# Patient Record
Sex: FEMALE | Race: WHITE | NOT HISPANIC OR LATINO | Employment: UNEMPLOYED | URBAN - METROPOLITAN AREA
[De-identification: names, ages, dates, MRNs, and addresses within clinical notes are randomized per-mention and may not be internally consistent; named-entity substitution may affect disease eponyms.]

---

## 2017-04-17 ENCOUNTER — OFFICE VISIT (OUTPATIENT)
Dept: OBGYN | Facility: CLINIC | Age: 37
End: 2017-04-17
Payer: COMMERCIAL

## 2017-04-17 VITALS
BODY MASS INDEX: 28.3 KG/M2 | HEART RATE: 84 BPM | HEIGHT: 62 IN | SYSTOLIC BLOOD PRESSURE: 120 MMHG | WEIGHT: 153.8 LBS | DIASTOLIC BLOOD PRESSURE: 76 MMHG

## 2017-04-17 DIAGNOSIS — Z30.011 ENCOUNTER FOR INITIAL PRESCRIPTION OF CONTRACEPTIVE PILLS: ICD-10-CM

## 2017-04-17 DIAGNOSIS — Z01.419 ENCOUNTER FOR GYNECOLOGICAL EXAMINATION WITHOUT ABNORMAL FINDING: Primary | ICD-10-CM

## 2017-04-17 PROCEDURE — G0145 SCR C/V CYTO,THINLAYER,RESCR: HCPCS | Performed by: OBSTETRICS & GYNECOLOGY

## 2017-04-17 PROCEDURE — 99385 PREV VISIT NEW AGE 18-39: CPT | Performed by: OBSTETRICS & GYNECOLOGY

## 2017-04-17 PROCEDURE — 87624 HPV HI-RISK TYP POOLED RSLT: CPT | Performed by: OBSTETRICS & GYNECOLOGY

## 2017-04-17 RX ORDER — DROSPIRENONE AND ETHINYL ESTRADIOL 0.03MG-3MG
1 KIT ORAL DAILY
Qty: 84 TABLET | Refills: 3 | Status: SHIPPED | OUTPATIENT
Start: 2017-04-17 | End: 2018-02-01

## 2017-04-17 RX ORDER — NORETHINDRONE 0.35 MG/1
TABLET ORAL
COMMUNITY
Start: 2016-05-31 | End: 2017-04-17

## 2017-04-17 NOTE — MR AVS SNAPSHOT
"              After Visit Summary   4/17/2017    Melissa Severson    MRN: [id number]           Patient Information     Date Of Birth          1980        Visit Information        Provider Department      4/17/2017 6:15 PM Syed Shook MD [facility]        Today's Diagnoses     Encounter for gynecological examination without abnormal finding    -  1    Encounter for initial prescription of contraceptive pills           Follow-ups after your visit        Future tests that were ordered for you today     Open Future Orders        Priority Expected Expires Ordered    *MA Screening Digital Bilateral Routine  4/17/2018 4/17/2017            Who to contact     If you have questions or need follow up information about today's clinic visit or your schedule please contact [facility] directly at [phone].  Normal or non-critical lab and imaging results will be communicated to you by MyChart, letter or phone within 4 business days after the clinic has received the results. If you do not hear from us within 7 days, please contact the clinic through Celirohart or phone. If you have a critical or abnormal lab result, we will notify you by phone as soon as possible.  Submit refill requests through Handango or call your pharmacy and they will forward the refill request to us. Please allow 3 business days for your refill to be completed.          Additional Information About Your Visit        MyChart Information     Handango lets you send messages to your doctor, view your test results, renew your prescriptions, schedule appointments and more. To sign up, go to [email or web address] . Click on \"Log in\" on the left side of the screen, which will take you to the Welcome page. Then click on \"Sign up Now\" on the right side of the page.     You will be asked to enter the access code listed below, as well as some personal information. Please follow the directions to create your username and " "password.     Your access code is: [email or web address]  Expires: 2017  7:54 PM     Your access code will  in 90 days. If you need help or a new code, please call your [place] clinic or [phone].        Care EveryWhere ID     This is your Care EveryWhere ID. This could be used by other organizations to access your [place] medical records  [id number]        Your Vitals Were     Pulse Height Last Period Breastfeeding? BMI (Body Mass Index)       84 5' 2.25\" (1.581 m) 2017 (Approximate) No 27.9 kg/m2        Blood Pressure from Last 3 Encounters:   17 120/76    Weight from Last 3 Encounters:   17 153 lb 12.8 oz (69.8 kg)              We Performed the Following     HPV High Risk Types DNA Cervical     Pap imaged thin layer screen with HPV - recommended age 30 - 65 years (select HPV order below)          Today's Medication Changes          These changes are accurate as of: 17  7:54 PM.  If you have any questions, ask your nurse or doctor.               Start taking these medicines.        Dose/Directions    drospirenone-ethinyl estradiol 3-0.03 MG per tablet   Commonly known as:  JOLIE   Used for:  Encounter for initial prescription of contraceptive pills   Started by:  Syed Shook MD        Dose:  1 tablet   Take 1 tablet by mouth daily   Quantity:  84 tablet   Refills:  3         Stop taking these medicines if you haven't already. Please contact your care team if you have questions.     KIRA 0.35 MG per tablet   Generic drug:  norethindrone   Stopped by:  Syed Shook MD                Where to get your medicines      These medications were sent to [facility]/pharmacy #[id number] - [place], [place] - [address]    [address], [facility] [address]     Phone:  [phone]     drospirenone-ethinyl estradiol 3-0.03 MG per tablet                Primary Care Provider    None Specified       No primary provider on file.        Thank you!     Thank you for choosing [place] [facility]  for your care. Our goal is always to provide you with excellent care. Hearing back from our patients is one way we can continue to improve our services. Please take a few minutes to complete the written survey that you may receive in the mail after your visit with us. Thank you!             Your Updated Medication List - Protect others around you: Learn how to safely use, store and throw away your medicines at www.disposemymeds.org.          This list is accurate as of: 4/17/17  7:54 PM.  Always use your most recent med list.                   Brand Name Dispense Instructions for use    drospirenone-ethinyl estradiol 3-0.03 MG per tablet    JOLIE    84 tablet    Take 1 tablet by mouth daily

## 2017-04-17 NOTE — NURSING NOTE
"Chief Complaint   Patient presents with     Gyn Exam     needs refill of OCP     Establish Care       Initial /76  Pulse 84  Ht 5' 2.25\" (1.581 m)  Wt 153 lb 12.8 oz (69.8 kg)  LMP 2017 (Approximate)  Breastfeeding? No  BMI 27.9 kg/m2 Estimated body mass index is 27.9 kg/(m^2) as calculated from the following:    Height as of this encounter: 5' 2.25\" (1.581 m).    Weight as of this encounter: 153 lb 12.8 oz (69.8 kg).  BP completed using cuff size: regular        The following HM Due: NONE      The following patient reported/Care Every where data was sent to:  P ABSTRACT QUALITY INITIATIVES [[id number]]  NA     n/a             "

## 2017-04-17 NOTE — LETTER
April 26, 2017    Melissa Severson  [address]  [facility] [address]    Dear Mary Ellen,  We are happy to inform you that your PAP smear result from 04/17/2017 is normal.  We are now able to do a follow up test on PAP smears. The DNA test is for HPV (Human Papilloma Virus). Cervical cancer is closely linked with certain types of HPV. Your result showed no evidence of HPV.  Therefore we recommend you return in 3 years for your next pap smear.  You will still need to return to the clinic every year for an annual exam and other preventive tests.  Please contact the clinic at [phone] with any questions.  Sincerely,    Syed Shook MD

## 2017-04-18 NOTE — PROGRESS NOTES
"  SUBJECTIVE:  Melissa Severson is an 36 year old  P2 woman who presents for annual exam. Patient's last menstrual period was 2017 (approximate).       Past Medical History:   Diagnosis Date     H/O calculus of kidney during pregnancy      Past Surgical History:   Procedure Laterality Date     EXC SKIN BENIG 0.5CM OR LESS TRUNK,ARM,LEG Right     lipoma benign     Current Outpatient Prescriptions   Medication     KIRA 0.35 MG per tablet     drospirenone-ethinyl estradiol (JOLIE) 3-0.03 MG per tablet     No current facility-administered medications for this visit.      No Known Allergies  Social History   Substance Use Topics     Smoking status: Never Smoker     Smokeless tobacco: Never Used     Alcohol use Yes      Comment: socially       Review of Systems  CONSTITUTIONAL:NEGATIVE  EYES: NEGATIVE  ENT/MOUTH: NEGATIVE  RESP: NEGATIVE  CV: NEGATIVE  GI: NEGATIVE  : notes history of difficulty voiding/but now improved  MUSCULOSKELATAL: NEGATIVE  INTEGUMENTARY/SKIN: NEGATIVE  BREAST: NEGATIVE  NEURO: NEGATIVE  ENDOCRINE: NEGATIVE  HEME/ALLERGY/IMMUNE: NEGATIVE  PSYCHIATRIC: NEGATIVE    OBJECTIVE:  /76  Pulse 84  Ht 5' 2.25\" (1.581 m)  Wt 153 lb 12.8 oz (69.8 kg)  LMP 2017 (Approximate)  Breastfeeding? No  BMI 27.9 kg/m2   EXAM:  GENERAL APPEARANCE: healthy, alert and no distress  BREAST: normal without masses, tenderness or nipple discharge and no palpable axillary masses or adenopathy  ABDOMEN: soft, nontender, without hepatosplenomegaly or masses    Pelvic Exam:  Urethra; negative  Vulva: No external lesions, normal hair distribution, no adenopathy  Vagina: Moist, pink, no abnormal discharge, well rugated, no lesions  Cervix: Pap smear is taken, parous, smooth, pink, no visible lesions  Uterus: Normal size, anteverted, non-tender, mobile  Ovaries: No mass, non-tender, mobile      ASSESSMENT:  Satisfactory annual gyn exam  History of difficulty emptying bladder       -evaluation " negative; now voiding improved       -records requested (from [facility])    PLAN:  (Z01.419) Encounter for gynecological examination without abnormal finding  (primary encounter diagnosis)  Plan: HPV High Risk Types DNA Cervical, Pap imaged         thin layer screen with HPV - recommended age 30        - 65 years (select HPV order below)            (Z30.011) Encounter for initial prescription of contraceptive pills  Plan: drospirenone-ethinyl estradiol (JOLIE) 3-0.03         MG per tablet            PE: reviewed health maintenance including diet, regular exercise and periodic exams.  Health Maintenance   Topic Date Due     TETANUS IMMUNIZATION (SYSTEM ASSIGNED)  10/03/1999     PAP SCREENING Q3 YR (SYSTEM ASSIGNED)  10/03/2002     INFLUENZA VACCINE (SYSTEM ASSIGNED)  09/01/2017

## 2017-04-21 LAB
COPATH REPORT: NORMAL
PAP: NORMAL

## 2017-04-24 LAB
FINAL DIAGNOSIS: NORMAL
HPV HR 12 DNA CVX QL NAA+PROBE: NEGATIVE
HPV16 DNA SPEC QL NAA+PROBE: NEGATIVE
HPV18 DNA SPEC QL NAA+PROBE: NEGATIVE
SPECIMEN DESCRIPTION: NORMAL

## 2017-04-26 NOTE — PROGRESS NOTES
Pap done on 4/17/17. Please send nl pap and Neg HPV letter, ([id number]) for pap in 3 years and annual physical in 1 year.   Chart reviewed,  cesar.     NALLELY Ibarra RN

## 2017-05-15 ENCOUNTER — HOSPITAL ENCOUNTER (OUTPATIENT)
Dept: MAMMOGRAPHY | Facility: CLINIC | Age: 37
Discharge: HOME | End: 2017-05-15
Attending: OBSTETRICS & GYNECOLOGY | Admitting: OBSTETRICS & GYNECOLOGY
Payer: COMMERCIAL

## 2017-05-15 DIAGNOSIS — Z01.419 ENCOUNTER FOR GYNECOLOGICAL EXAMINATION WITHOUT ABNORMAL FINDING: ICD-10-CM

## 2017-05-15 PROCEDURE — G0202 SCR MAMMO BI INCL CAD: HCPCS

## 2018-02-01 DIAGNOSIS — Z30.011 ENCOUNTER FOR INITIAL PRESCRIPTION OF CONTRACEPTIVE PILLS: ICD-10-CM

## 2018-02-01 RX ORDER — DROSPIRENONE AND ETHINYL ESTRADIOL 0.03MG-3MG
1 KIT ORAL DAILY
Qty: 28 TABLET | Refills: 0 | Status: SHIPPED | OUTPATIENT
Start: 2018-02-01 | End: 2018-03-20

## 2018-03-20 ENCOUNTER — OFFICE VISIT (OUTPATIENT)
Dept: OBGYN | Facility: CLINIC | Age: 38
End: 2018-03-20
Payer: COMMERCIAL

## 2018-03-20 VITALS
DIASTOLIC BLOOD PRESSURE: 62 MMHG | HEART RATE: 82 BPM | BODY MASS INDEX: 23.59 KG/M2 | SYSTOLIC BLOOD PRESSURE: 128 MMHG | WEIGHT: 130 LBS

## 2018-03-20 DIAGNOSIS — Z30.011 ENCOUNTER FOR INITIAL PRESCRIPTION OF CONTRACEPTIVE PILLS: ICD-10-CM

## 2018-03-20 DIAGNOSIS — Z00.00 HEALTH CARE MAINTENANCE: Primary | ICD-10-CM

## 2018-03-20 DIAGNOSIS — R19.7 DIARRHEA, UNSPECIFIED TYPE: ICD-10-CM

## 2018-03-20 LAB
CHOLEST SERPL-MCNC: 206 MG/DL
HDLC SERPL-MCNC: 94 MG/DL
LDLC SERPL CALC-MCNC: 93 MG/DL
NONHDLC SERPL-MCNC: 112 MG/DL
TRIGL SERPL-MCNC: 95 MG/DL

## 2018-03-20 PROCEDURE — 80061 LIPID PANEL: CPT | Performed by: ADVANCED PRACTICE MIDWIFE

## 2018-03-20 PROCEDURE — 99395 PREV VISIT EST AGE 18-39: CPT | Performed by: ADVANCED PRACTICE MIDWIFE

## 2018-03-20 PROCEDURE — 36415 COLL VENOUS BLD VENIPUNCTURE: CPT | Performed by: ADVANCED PRACTICE MIDWIFE

## 2018-03-20 RX ORDER — DROSPIRENONE AND ETHINYL ESTRADIOL 0.03MG-3MG
1 KIT ORAL DAILY
Qty: 84 TABLET | Refills: 3 | Status: SHIPPED | OUTPATIENT
Start: 2018-03-20 | End: 2020-01-20

## 2018-03-20 NOTE — MR AVS SNAPSHOT
After Visit Summary   3/20/2018    Melissa Severson    MRN: [id number]           Patient Information     Date Of Birth          1980        Visit Information        Provider Department      3/20/2018 8:45 AM Araseli Diaz APRN CNM [facility]        Today's Diagnoses     Health care maintenance    -  1    Encounter for initial prescription of contraceptive pills        Diarrhea, unspecified type           Follow-ups after your visit        Additional Services     GASTROENTEROLOGY ADULT REF CONSULT ONLY       Preferred Location: [facility] [phone]      Please be aware that coverage of these services is subject to the terms and limitations of your health insurance plan.  Call member services at your health plan with any benefit or coverage questions.  Any procedures must be performed at a [place] facility OR coordinated by your clinic's referral office.    Please bring the following with you to your appointment:    (1) Any X-Rays, CTs or MRIs which have been performed.  Contact the facility where they were done to arrange for  prior to your scheduled appointment.    (2) List of current medications   (3) This referral request   (4) Any documents/labs given to you for this referral                  Follow-up notes from your care team     Return in about 1 year (around 3/20/2019).      Who to contact     If you have questions or need follow up information about today's clinic visit or your schedule please contact [facility] directly at [phone].  Normal or non-critical lab and imaging results will be communicated to you by MyChart, letter or phone within 4 business days after the clinic has received the results. If you do not hear from us within 7 days, please contact the clinic through MyChart or phone. If you have a critical or abnormal lab result, we will notify you by phone as soon as possible.  Submit refill requests through [employer]hart or call your  "pharmacy and they will forward the refill request to us. Please allow 3 business days for your refill to be completed.          Additional Information About Your Visit        MyChart Information     [employer] lets you send messages to your doctor, view your test results, renew your prescriptions, schedule appointments and more. To sign up, go to [email or web address] . Click on \"Log in\" on the left side of the screen, which will take you to the Welcome page. Then click on \"Sign up Now\" on the right side of the page.     You will be asked to enter the access code listed below, as well as some personal information. Please follow the directions to create your username and password.     Your access code is: [email or web address]  Expires: 2018  9:17 AM     Your access code will  in 90 days. If you need help or a new code, please call your [place] clinic or [phone].        Care EveryWhere ID     This is your Care EveryWhere ID. This could be used by other organizations to access your [place] medical records  [id number]        Your Vitals Were     Pulse Last Period Breastfeeding? BMI (Body Mass Index)          82 2018 (Exact Date) No 23.59 kg/m2         Blood Pressure from Last 3 Encounters:   18 128/62   17 120/76    Weight from Last 3 Encounters:   18 130 lb (59 kg)   17 153 lb 12.8 oz (69.8 kg)              We Performed the Following     GASTROENTEROLOGY ADULT REF CONSULT ONLY     Lipid panel reflex to direct LDL Fasting          Where to get your medicines      These medications were sent to [id number] - [place], [place] - [address]    [address], [facility] [address]     Phone:  [phone]     drospirenone-ethinyl estradiol 3-0.03 MG per tablet          Primary Care Provider Office Phone # Fax #    Syed Shook [phone] [address] [place] [address]        Equal Access to Services     GEORGE LEE AH: Hadii " ender Law, wamariano juárezadaha, qajanuaryta kaximena tristonsri, waxseamus christy rosalesryleesaundra chen butch. So [facility] [phone].    ATENCIÓN: Si habla español, tiene a morris disposición servicios gratuitos de asistencia lingüística. Llame al [phone].    We comply with applicable federal civil rights laws and [place] laws. We do not discriminate on the basis of race, color, national origin, age, disability, sex, sexual orientation, or gender identity.            Thank you!     Thank you for choosing [facility]  for your care. Our goal is always to provide you with excellent care. Hearing back from our patients is one way we can continue to improve our services. Please take a few minutes to complete the written survey that you may receive in the mail after your visit with us. Thank you!             Your Updated Medication List - Protect others around you: Learn how to safely use, store and throw away your medicines at www.disposemymeds.org.          This list is accurate as of 3/20/18  9:17 AM.  Always use your most recent med list.                   Brand Name Dispense Instructions for use Diagnosis    drospirenone-ethinyl estradiol 3-0.03 MG per tablet    JOLIE    84 tablet    Take 1 tablet by mouth daily    Encounter for initial prescription of contraceptive pills          clear

## 2018-03-20 NOTE — PROGRESS NOTES
"Mary Ellen is a 37 year old  female who presents for annual exam.     Besides routine health maintenance,  she would like to discuss recurrent diarrhea that has been present for about 2 years. States that the diarrea is not daily but seems to occur \"most often than normal.\"  States she has a first cousin that was diagnosed with colon cancer at age 34.   Menses are regular q 28-30 days and normal lasting 4 days.   Menses flow: normal.    Patient's last menstrual period was 2018 (exact date)..   Using oral contraceptives for contraception.    She is not currently considering pregnancy.    REPRODUCTIVE/GYNECOLOGIC HISTORY:  Mary Ellen is sexually active with male partner(s) and is currently in monogamous relationship.   STI testing offered?  Declined  History of abnormal Pap smear:  No  SOCIAL HISTORY  Abuse: does not report having previously been physical or sexually abused.    Do you feel safe in your environment? YES    She  reports that she has never smoked. She has never used smokeless tobacco.      HEALTH MAINTENANCE:  Cholesterol: (No results found for: CHOL History of abnormal lipids: No  Mammo: Baseline . History of abnormal Mammo: No.  Regular Self Breast Exams: No  TSH: (No results found for: TSH )  Pap; (  Lab Results   Component Value Date    PAP NIL 2017    )  Immunizations up to date: yes  (Gardasil, Tdap, Flu)  Health maintenance updated:  yes    HEALTHY HABITS  Eating habits: eats regular meals and has adequate calcium intake  Calcium/Vitamin D intake: source:  dairy Adequate?   Exercise: How often do you exercise? 3-5 times/week;Cardio and Strength Training  Have you had an eye exam in the last two years? YES  Do you routinely see the dentist once or twice yearly? YES      HISTORY:  Obstetric History       T2      L2     SAB0   TAB0   Ectopic0   Multiple0   Live Births2       # Outcome Date GA Lbr Dk/2nd Weight Sex Delivery Anes PTL Lv   2 Term 14   6 lb 8 oz (2.948 " kg) M CS-LTranv  N SHELLI      Complications: Fetal Intolerance   1 Term 12 40w2d  6 lb 14 oz (3.118 kg) F   N SHELLI        Past Medical History:   Diagnosis Date     H/O calculus of kidney during pregnancy      Past Surgical History:   Procedure Laterality Date     EXC SKIN BENIG 0.5CM OR LESS TRUNK,ARM,LEG Right     lipoma benign     Family History   Problem Relation Age of Onset     Hypertension Mother      Lung Cancer Father 55     passed 56yrs     Melanoma Maternal Grandfather      HEART DISEASE Paternal Grandmother      HEART DISEASE Paternal Grandfather      Social History     Social History     Marital status:      Spouse name: N/A     Number of children: N/A     Years of education: N/A     Social History Main Topics     Smoking status: Never Smoker     Smokeless tobacco: Never Used     Alcohol use Yes      Comment: socially     Drug use: No     Sexual activity: Yes     Partners: Male     Birth control/ protection: Pill     Other Topics Concern     None     Social History Narrative       Current Outpatient Prescriptions:      drospirenone-ethinyl estradiol (JOLIE) 3-0.03 MG per tablet, Take 1 tablet by mouth daily, Disp: 28 tablet, Rfl: 0     Allergies   Allergen Reactions     Amoxicillin Rash       Past medical, surgical, social and family history were reviewed and updated in EPIC.    ROS:   12 point review of systems negative other than symptoms noted below.  Gastrointestinal: Diarrhea    PHYSICAL EXAM:  /62 (BP Location: Right arm, Patient Position: Sitting, Cuff Size: Adult Regular)  Pulse 82  Wt 130 lb (59 kg)  LMP 2018 (Exact Date)  Breastfeeding? No  BMI 23.59 kg/m2   BMI: Body mass index is 23.59 kg/(m^2).  Constitutional: healthy, alert and no distress  Neck: symmetrical, thyroid normal size, no masses present, no lymphadenopathy present.   Cardiovascular: RRR, no murmurs present  Respiratory: breathing unlabored, lungs CTA bilaterally  Breast:normal without  masses, tenderness or nipple discharge and no palpable axillary masses or adenopathy  Gastrointestinal: abdomen soft, non-tender, bowel sounds present  PELVIC EXAM:  Vulva: No lesions, no adenopathy, BUS WNL  Vagina: Moist, pink, discharge normal  well rugated, no lesions  Cervix:smooth, pink, no visible lesions  Uterus: Normal size, non-tender, mobile  Ovaries: No masses palpated  Rectal exam: deferred    ASSESSMENT/PLAN:    ICD-10-CM    1. Health care maintenance Z00.00 Lipid panel reflex to direct LDL Fasting   2. Encounter for initial prescription of contraceptive pills Z30.011 drospirenone-ethinyl estradiol (JOLIE) 3-0.03 MG per tablet   3. Diarrhea, unspecified type R19.7 GASTROENTEROLOGY ADULT REF CONSULT ONLY           COUNSELING:   Reviewed preventive health counseling, as reflected in patient instructions       Regular exercise       Healthy diet/nutrition       Contraception     ACHES reviewed and when to seek medical attention  Script for oral contraceptive pill sent to pharmacy on file  Referral to [facility] for ongoing diarrhea  Lipid panel today  Return to Clinic In 1 year  Call with questions/concerns    30 minutes was spent face to face with the patient today discussing her history, diagnosis, and follow-up plan as noted above. Over 50% of the visit was spent in counseling and coordination of care of oral contraceptive pill, GI follow up.    KEVIN Louise, CNCODEY, WHTAPAN

## 2018-03-20 NOTE — NURSING NOTE
"Chief Complaint   Patient presents with     Gyn Exam     Annual with birth control refill       Initial /62 (BP Location: Right arm, Patient Position: Sitting, Cuff Size: Adult Regular)  Pulse 82  Wt 130 lb (59 kg)  LMP 2018 (Exact Date)  Breastfeeding? No  BMI 23.59 kg/m2 Estimated body mass index is 23.59 kg/(m^2) as calculated from the following:    Height as of 17: 5' 2.25\" (1.581 m).    Weight as of this encounter: 130 lb (59 kg).  BP completed using cuff size: regular        The following HM Due: NONE      The following patient reported/Care Every where data was sent to:  P ABSTRACT QUALITY INITIATIVES [[id number]]      patient has appointment for today.  William Ohara CMA                 "

## 2019-02-27 ENCOUNTER — OFFICE VISIT (OUTPATIENT)
Dept: OBGYN | Facility: CLINIC | Age: 39
End: 2019-02-27
Payer: COMMERCIAL

## 2019-02-27 VITALS
WEIGHT: 132 LBS | SYSTOLIC BLOOD PRESSURE: 128 MMHG | BODY MASS INDEX: 23.39 KG/M2 | DIASTOLIC BLOOD PRESSURE: 78 MMHG | HEIGHT: 63 IN

## 2019-02-27 DIAGNOSIS — N81.11 CYSTOCELE, MIDLINE: ICD-10-CM

## 2019-02-27 DIAGNOSIS — Z01.419 WELL WOMAN EXAM: Primary | ICD-10-CM

## 2019-02-27 DIAGNOSIS — Z30.41 ENCOUNTER FOR SURVEILLANCE OF CONTRACEPTIVE PILLS: ICD-10-CM

## 2019-02-27 DIAGNOSIS — R19.7 DIARRHEA, UNSPECIFIED TYPE: ICD-10-CM

## 2019-02-27 LAB — HBA1C MFR BLD: 4.9 % (ref 0–5.6)

## 2019-02-27 PROCEDURE — 99395 PREV VISIT EST AGE 18-39: CPT | Performed by: ADVANCED PRACTICE MIDWIFE

## 2019-02-27 PROCEDURE — 80061 LIPID PANEL: CPT | Performed by: ADVANCED PRACTICE MIDWIFE

## 2019-02-27 PROCEDURE — 36415 COLL VENOUS BLD VENIPUNCTURE: CPT | Performed by: ADVANCED PRACTICE MIDWIFE

## 2019-02-27 PROCEDURE — 83036 HEMOGLOBIN GLYCOSYLATED A1C: CPT | Performed by: ADVANCED PRACTICE MIDWIFE

## 2019-02-27 RX ORDER — DROSPIRENONE AND ETHINYL ESTRADIOL 0.03MG-3MG
1 KIT ORAL DAILY
Qty: 90 TABLET | Refills: 3 | Status: SHIPPED | OUTPATIENT
Start: 2019-02-27 | End: 2020-01-17

## 2019-02-27 ASSESSMENT — MIFFLIN-ST. JEOR: SCORE: 1252.88

## 2019-02-27 NOTE — NURSING NOTE
"Chief Complaint   Patient presents with     Gyn Exam     Referral to GI- diarrhea      Gastrointestinal Problem       Initial /78 (BP Location: Right arm, Patient Position: Sitting, Cuff Size: Adult Regular)   Ht 1.6 m (5' 3\")   Wt 59.9 kg (132 lb)   BMI 23.38 kg/m   Estimated body mass index is 23.38 kg/m  as calculated from the following:    Height as of this encounter: 1.6 m (5' 3\").    Weight as of this encounter: 59.9 kg (132 lb).  BP completed using cuff size: regular    Questioned patient about current smoking habits.  Pt. has never smoked.          The following HM Due: NONE    William Ohara CMA                "

## 2019-02-27 NOTE — PROGRESS NOTES
Mary Ellen is a 38 year old  female who presents for annual exam.     Besides routine health maintenance,  she would like to discuss frequent diarrhea.  HPI:  Patient states that she has been having frequent diarrhea for 3+ years. She sometimes experiences this daily for a week and then will go a few days without issues. Denies blood in the stool. Denies hemorrhoids. States she thinks it may be related to her diet or coffee but she is concerned because her ~30 year old cousin was recently diagnosed with colon cancer. Denies nausea and vomiting. Denies constipation. Does not think it is related to her menstrual cycle.   Menses are regular q 28-30 days and normal lasting 5 days.   Menses flow: normal.    LMP 2019  Using oral contraceptives for contraception.    She is not currently considering pregnancy.    REPRODUCTIVE/GYNECOLOGIC HISTORY:  Mary Ellen is sexually active with male partner(s) and is currently in monogamous relationship.   STI testing offered?  Declined  History of abnormal Pap smear:  No  SOCIAL HISTORY  Abuse: does not report having previously been physical or sexually abused.    Do you feel safe in your environment? YES    She  reports that  has never smoked. she has never used smokeless tobacco.        HEALTH MAINTENANCE:  Cholesterol: (  Cholesterol   Date Value Ref Range Status   2018 206 (H) <200 mg/dL Final     Comment:     Desirable:       <200 mg/dl    History of abnormal lipids: Yes  Mammo: 2017 . History of abnormal Mammo: No.  TSH: (No results found for: TSH )  Pap; (  Lab Results   Component Value Date    PAP NIL 2017    )  Immunizations up to date: yes  (Gardasil, Tdap, Flu)  Health maintenance updated:  yes    HEALTHY HABITS  Eating habits: eats regular meals  Calcium/Vitamin D intake: source:  dairy Adequate?   Exercise: How often do you exercise? 3-5 times/week; Cardio  Have you had an eye exam in the last two years? YES  Do you routinely see the dentist once or twice  yearly? YES      HISTORY:  Obstetric History       T2      L2     SAB0   TAB0   Ectopic0   Multiple0   Live Births2       # Outcome Date GA Lbr Dk/2nd Weight Sex Delivery Anes PTL Lv   2 Term 14   2.948 kg (6 lb 8 oz) M CS-LTranv  N SHELLI      Complications: Fetal Intolerance   1 Term 12 40w2d  3.118 kg (6 lb 14 oz) F   N SHELLI        Past Medical History:   Diagnosis Date     H/O calculus of kidney during pregnancy      Past Surgical History:   Procedure Laterality Date     EXC SKIN BENIG 0.5CM OR LESS TRUNK,ARM,LEG Right     lipoma benign     Family History   Problem Relation Age of Onset     Hypertension Mother      Lung Cancer Father 55        passed 56yrs     Other Cancer Maternal Grandfather         Basal Cell Carcinoma     Heart Disease Paternal Grandmother      Heart Disease Paternal Grandfather      Social History     Socioeconomic History     Marital status:      Spouse name: None     Number of children: None     Years of education: None     Highest education level: None   Occupational History     None   Social Needs     Financial resource strain: None     Food insecurity:     Worry: None     Inability: None     Transportation needs:     Medical: None     Non-medical: None   Tobacco Use     Smoking status: Never Smoker     Smokeless tobacco: Never Used   Substance and Sexual Activity     Alcohol use: Yes     Comment: socially     Drug use: No     Sexual activity: Yes     Partners: Male     Birth control/protection: Pill   Lifestyle     Physical activity:     Days per week: None     Minutes per session: None     Stress: None   Relationships     Social connections:     Talks on phone: None     Gets together: None     Attends Restorationism service: None     Active member of club or organization: None     Attends meetings of clubs or organizations: None     Relationship status: None     Intimate partner violence:     Fear of current or ex partner: None     Emotionally  "abused: None     Physically abused: None     Forced sexual activity: None   Other Topics Concern     Parent/sibling w/ CABG, MI or angioplasty before 65F 55M? Not Asked   Social History Narrative     None       Current Outpatient Medications:      drospirenone-ethinyl estradiol (JOLIE) 3-0.03 MG per tablet, Take 1 tablet by mouth daily, Disp: 84 tablet, Rfl: 3     drospirenone-ethinyl estradiol (JOLIE) 3-0.03 MG tablet, Take 1 tablet by mouth daily, Disp: 90 tablet, Rfl: 3     Allergies   Allergen Reactions     Amoxicillin Rash       Past medical, surgical, social and family history were reviewed and updated in EPIC.    ROS:   12 point review of systems negative other than symptoms noted below.  Gastrointestinal: Diarrhea  Genitourinary: Incontinence, occasionally when she coughs or laughs    PHYSICAL EXAM:  /78 (BP Location: Right arm, Patient Position: Sitting, Cuff Size: Adult Regular)   Ht 1.6 m (5' 3\")   Wt 59.9 kg (132 lb)   BMI 23.38 kg/m     BMI: Body mass index is 23.38 kg/m .  Constitutional: healthy, alert and no distress  Neck: symmetrical, thyroid normal size, no masses present, no lymphadenopathy present.   Cardiovascular: RRR, no murmurs present  Respiratory: breathing unlabored, lungs CTA bilaterally  Breast:normal without masses, tenderness or nipple discharge and no palpable axillary masses or adenopathy  Gastrointestinal: abdomen soft, non-tender, no rebound tenderness, bowel sounds present  PELVIC EXAM:  Vulva: No lesions, no adenopathy, BUS WNL  Vagina: Moist. SVE deferred. Midline cystocele grade 1 noted.   Cervix:smooth, no CMT  Uterus: Normal size, non-tender, mobile  Ovaries: No masses palpated  Rectal exam: No hemorrhoids present    ASSESSMENT/PLAN:    ICD-10-CM    1. Well woman exam Z01.419 Hemoglobin A1c     Lipid panel reflex to direct LDL Fasting   2. Diarrhea, unspecified type R19.7 GASTROENTEROLOGY ADULT REF CONSULT ONLY   3. Encounter for surveillance of contraceptive " pills Z30.41 drospirenone-ethinyl estradiol (JOLIE) 3-0.03 MG tablet   4. Cystocele, midline N81.11 TIFF PT, HAND, AND CHIROPRACTIC REFERRAL     Results for orders placed or performed in visit on 02/27/19   Hemoglobin A1c   Result Value Ref Range    Hemoglobin A1C 4.9 0 - 5.6 %     Follow up with GI ordered. Suspect likely irritable bowel syndrome.   Follow up with pelvic floor physical therapy for cystocele, occasional stress incontinence, and likely IBS  Refilled OCPs  Return in 1 year or PRN    COUNSELING:   Reviewed preventive health counseling, as reflected in patient instructions  Special attention given to:        Contraception   reports that  has never smoked. she has never used smokeless tobacco.      Tammy Ellis CNM, WHNP-BC

## 2019-02-28 LAB
CHOLEST SERPL-MCNC: 221 MG/DL
HDLC SERPL-MCNC: 105 MG/DL
LDLC SERPL CALC-MCNC: 99 MG/DL
NONHDLC SERPL-MCNC: 116 MG/DL
TRIGL SERPL-MCNC: 84 MG/DL

## 2019-03-19 ENCOUNTER — THERAPY VISIT (OUTPATIENT)
Dept: PHYSICAL THERAPY | Facility: CLINIC | Age: 39
End: 2019-03-19
Payer: COMMERCIAL

## 2019-03-19 DIAGNOSIS — M62.81 MUSCLE WEAKNESS (GENERALIZED): ICD-10-CM

## 2019-03-19 DIAGNOSIS — R35.0 URINARY FREQUENCY: ICD-10-CM

## 2019-03-19 DIAGNOSIS — N81.11 CYSTOCELE, MIDLINE: ICD-10-CM

## 2019-03-19 PROCEDURE — 97161 PT EVAL LOW COMPLEX 20 MIN: CPT | Mod: GP | Performed by: PHYSICAL THERAPIST

## 2019-03-19 PROCEDURE — 97110 THERAPEUTIC EXERCISES: CPT | Mod: GP | Performed by: PHYSICAL THERAPIST

## 2019-03-19 PROCEDURE — 97535 SELF CARE MNGMENT TRAINING: CPT | Mod: GP | Performed by: PHYSICAL THERAPIST

## 2019-03-19 NOTE — PROGRESS NOTES
[facility] Initial Evaluation  Subjective:                                         Medical allergies: sensitive to amox.  Other surgeries include:  Other ().  Medication history: birth control pills, multi-vit.  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            Red flags:  Changes in bowel/bladder.                        Objective:  System    Physical Exam    General     ROS    Assessment/Plan:

## 2019-03-19 NOTE — PROGRESS NOTES
[facility] Initial Evaluation  Subjective:                                         Medical allergies: sensitive to amox.  Other surgeries include:  Other ().  Medication history: birth control, multi vit.  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            Red flags:  Changes in bowel/bladder.                        Objective:  System    Physical Exam    General     ROS    Assessment/Plan:

## 2019-03-19 NOTE — PROGRESS NOTES
[facility] Initial Evaluation  Subjective:    Melissa Severson is a 38 year old female with a pelvic dysfunction (urinary frequency, incomplete voiding) condition.  Condition occurred with:  After pregnancy (2 children, ages 5 & 7).      Patient reports no incontinence or dyspareunia. History of vaginal pain x 9 months after first child (vaginal deilvery) and difficulty initiating voiding after solis catheter was removed after second delivery (). She also had kidney stones during first pregnancy. She has been having frequent diarrhea and will be seeing a GI specialist in .                                                                        Objective:  System                                 Pelvic Dysfunction Evaluation:      Diagnostic Tests:  Diagnostic tests pelvic: pelvic US was normal.  Pelvic Exam:  Normal                      Flexibility:  normal      Abdominal Wall:        Scar Mobility:  Mild restrictions by umbilicus and  scar  Pelvic Clock Exam:  normal            SI Provocation:  normal        Reflex Testing:  normal    External Assessment:  normal              Internal Assessment:  Internal assessment pelvic: god contraction, delayed release of levator ani mm. Non-tender.  Sensory Exam:  Normal  Contraction/Grade:  Fair squeeze, definite lift (3)          SEMG Biofeedback:  NA                  Additional History:    Number of Pregnancies: 2  Number of Live Births: 2  Caffeine Consumption:  2 cups in am                     General     ROS    Assessment/Plan:    Patient is a 38 year old female with pelvic complaints.    Patient has the following significant findings with corresponding treatment plan.                Diagnosis 1:  Pelvic floor dysfunction  Decreased ROM/flexibility - therapeutic exercise and home program  Decreased strength - therapeutic exercise, therapeutic activities and home program  Impaired muscle performance - neuro re-education and self  cares    Therapy Evaluation Codes:   1) History comprised of:   Personal factors that impact the plan of care:      None.    Comorbidity factors that impact the plan of care are:      None.     Medications impacting care: None.  2) Examination of Body Systems comprised of:   Body structures and functions that impact the plan of care:      Pelvis.   Activity limitations that impact the plan of care are:      Frequency and incomplete voiding.  3) Clinical presentation characteristics are:   Stable/Uncomplicated.  4) Decision-Making    Low complexity using standardized patient assessment instrument and/or measureable assessment of functional outcome.  Cumulative Therapy Evaluation is: Low complexity.    Previous and current functional limitations:  (See Goal Flow Sheet for this information)    Short term and Long term goals: (See Goal Flow Sheet for this information)     Communication ability:  Patient appears to be able to clearly communicate and understand verbal and written communication and follow directions correctly.  Treatment Explanation - The following has been discussed with the patient:   RX ordered/plan of care  Anticipated outcomes  Possible risks and side effects  This patient would benefit from PT intervention to resume normal activities.   Rehab potential is excellent.    Frequency:  2 X a month, once daily  Duration:  for 2 months  Discharge Plan:  Achieve all LTG.  Independent in home treatment program.  Reach maximal therapeutic benefit.    Please refer to the daily flowsheet for treatment today, total treatment time and time spent performing 1:1 timed codes.

## 2019-04-09 NOTE — TELEPHONE ENCOUNTER
FUTURE VISIT INFORMATION      FUTURE VISIT INFORMATION:    Date: 6/7/19    Time: 1:40pm    Location: [facility]  REFERRAL INFORMATION:    Referring provider:  Tammy Ellis CNM    Referring providers clinic:  [facility]    Reason for visit/diagnosis:  Diarrhea (3 years)    NOTES STATUS DETAILS   OFFICE NOTE from referring provider  Internal 2/27/19   OFFICE NOTE from other specialist   Internal 3/20/18   DISCHARGE SUMMARY FROM HOSPITAL N/A    DISCHARGE REPORT FROM ED N/A    OPERATIVE REPORT  N/A    PFC REPORT N/A    MEDICATION LIST Internal    LABS     FIT/STOOL TESTING N/A    PERTINENT LABS N/A    PATHOLOGY REPORTS RELATED TO DIAGNOSIS N/A    DIAGNOSTIC PROCEDURES     COLONOSCOPY N/A    ENDOSCOPY (EGD) N/A    ERCP N/A    EUS N/A    FLEX SIGMOIDOSCOPY N/A    IMAGING & REPORT      CT, MRI, US, XR N/A

## 2019-04-16 ENCOUNTER — THERAPY VISIT (OUTPATIENT)
Dept: PHYSICAL THERAPY | Facility: CLINIC | Age: 39
End: 2019-04-16
Payer: COMMERCIAL

## 2019-04-16 DIAGNOSIS — M62.81 MUSCLE WEAKNESS (GENERALIZED): ICD-10-CM

## 2019-04-16 DIAGNOSIS — R35.0 URINARY FREQUENCY: ICD-10-CM

## 2019-04-16 PROCEDURE — 97535 SELF CARE MNGMENT TRAINING: CPT | Mod: GP | Performed by: PHYSICAL THERAPIST

## 2019-04-16 PROCEDURE — 97110 THERAPEUTIC EXERCISES: CPT | Mod: GP | Performed by: PHYSICAL THERAPIST

## 2019-04-16 PROCEDURE — 97112 NEUROMUSCULAR REEDUCATION: CPT | Mod: GP | Performed by: PHYSICAL THERAPIST

## 2019-04-16 NOTE — PROGRESS NOTES
Subjective:  HPI                    Objective:  System    Physical Exam    General     ROS    Assessment/Plan:    DISCHARGE REPORT    Progress reporting period is from 3/19/2019 to 4/16/2019.     SUBJECTIVE    Subjective: Patient reports significant improvement since starting pelvic PT. She is able to fully void without pushing 95% of the time. She is much more aware of needing to release pelvic floor muscles.    Current Pain level: 0/10   Initial Pain level: 0/10   Changes in function: Yes, see goal flow sheet for change in function   Adverse reactions: None.    OBJECTIVE    Objective: Good awareness of pelvic floor muscles in relation to bladder and voiding. Instructed in timed voiding and urge suppression techniques to help with urinary frequency. Added core strengthening exercises and tolerated well.       ASSESSMENT/PLAN    STG/LTGs have been met or progress has been made towards goals:  Yes (See Goal flow sheet completed today.)  Assessment of Progress: The patient's condition is improving.  The patient's condition has potential to improve.  Self Management Plans:  Patient is independent in a home treatment program.  Patient is independent in self management of symptoms.    Mary Ellen continues to require the following intervention to meet STG and LTG's: PT intervention is no longer required to meet STG/LTG.      Recommendations:    This patient is ready to be discharged from therapy and continue their home treatment program.    Please refer to the daily flowsheet for treatment today, total treatment time and time spent performing 1:1 timed codes.

## 2019-05-31 ENCOUNTER — TELEPHONE (OUTPATIENT)
Dept: GASTROENTEROLOGY | Facility: CLINIC | Age: 39
End: 2019-05-31

## 2019-06-07 ENCOUNTER — TELEPHONE (OUTPATIENT)
Dept: GASTROENTEROLOGY | Facility: CLINIC | Age: 39
End: 2019-06-07

## 2019-06-07 ENCOUNTER — PRE VISIT (OUTPATIENT)
Dept: GASTROENTEROLOGY | Facility: CLINIC | Age: 39
End: 2019-06-07

## 2019-06-07 ENCOUNTER — OFFICE VISIT (OUTPATIENT)
Dept: GASTROENTEROLOGY | Facility: CLINIC | Age: 39
End: 2019-06-07
Attending: ADVANCED PRACTICE MIDWIFE
Payer: COMMERCIAL

## 2019-06-07 VITALS
WEIGHT: 135.8 LBS | HEIGHT: 63 IN | DIASTOLIC BLOOD PRESSURE: 98 MMHG | SYSTOLIC BLOOD PRESSURE: 149 MMHG | HEART RATE: 75 BPM | OXYGEN SATURATION: 96 % | BODY MASS INDEX: 24.06 KG/M2

## 2019-06-07 DIAGNOSIS — R19.4 CHANGE IN BOWEL HABIT: ICD-10-CM

## 2019-06-07 DIAGNOSIS — R19.4 CHANGE IN BOWEL HABIT: Primary | ICD-10-CM

## 2019-06-07 LAB
ALBUMIN SERPL-MCNC: 3.9 G/DL (ref 3.4–5)
ALP SERPL-CCNC: 46 U/L (ref 40–150)
ALT SERPL W P-5'-P-CCNC: 20 U/L (ref 0–50)
ANION GAP SERPL CALCULATED.3IONS-SCNC: 5 MMOL/L (ref 3–14)
AST SERPL W P-5'-P-CCNC: 20 U/L (ref 0–45)
BILIRUB SERPL-MCNC: 0.3 MG/DL (ref 0.2–1.3)
BUN SERPL-MCNC: 13 MG/DL (ref 7–30)
CALCIUM SERPL-MCNC: 9.4 MG/DL (ref 8.5–10.1)
CHLORIDE SERPL-SCNC: 105 MMOL/L (ref 94–109)
CO2 SERPL-SCNC: 26 MMOL/L (ref 20–32)
CREAT SERPL-MCNC: 0.73 MG/DL (ref 0.52–1.04)
ERYTHROCYTE [DISTWIDTH] IN BLOOD BY AUTOMATED COUNT: 11.7 % (ref 10–15)
ERYTHROCYTE [SEDIMENTATION RATE] IN BLOOD BY WESTERGREN METHOD: 8 MM/H (ref 0–20)
GFR SERPL CREATININE-BSD FRML MDRD: >90 ML/MIN/{1.73_M2}
GLUCOSE SERPL-MCNC: 91 MG/DL (ref 70–99)
HCT VFR BLD AUTO: 39.8 % (ref 35–47)
HGB BLD-MCNC: 13.4 G/DL (ref 11.7–15.7)
MCH RBC QN AUTO: 33.1 PG (ref 26.5–33)
MCHC RBC AUTO-ENTMCNC: 33.7 G/DL (ref 31.5–36.5)
MCV RBC AUTO: 98 FL (ref 78–100)
PLATELET # BLD AUTO: 224 10E9/L (ref 150–450)
POTASSIUM SERPL-SCNC: 4 MMOL/L (ref 3.4–5.3)
PROT SERPL-MCNC: 7.8 G/DL (ref 6.8–8.8)
RBC # BLD AUTO: 4.05 10E12/L (ref 3.8–5.2)
SODIUM SERPL-SCNC: 137 MMOL/L (ref 133–144)
WBC # BLD AUTO: 7.2 10E9/L (ref 4–11)

## 2019-06-07 ASSESSMENT — ENCOUNTER SYMPTOMS
BOWEL INCONTINENCE: 0
RECTAL PAIN: 0
DIARRHEA: 1
JAUNDICE: 0
ABDOMINAL PAIN: 0
BLOOD IN STOOL: 0
NAUSEA: 0
CONSTIPATION: 0
BLOATING: 0
HEARTBURN: 0
VOMITING: 0

## 2019-06-07 ASSESSMENT — MIFFLIN-ST. JEOR: SCORE: 1270.11

## 2019-06-07 ASSESSMENT — PAIN SCALES - GENERAL: PAINLEVEL: NO PAIN (0)

## 2019-06-07 NOTE — LETTER
6/7/2019       RE: Melissa Severson  [id number]  [place] [address]     Dear Colleague,    Thank you for referring your patient, Melissa Severson, to the [facility] at [facility]. Please see a copy of my visit note below.    GI CLINIC VISIT    CC/REFERRING MD:  Tammy Ellis  REASON FOR CONSULTATION: change in bowel habits    ASSESSMENT/PLAN:  38-year-old female with no significant past medical history who presents to the GI clinic for consultation regarding change in bowel habits over the last 3 to 5 years.     1.  Change in bowel pattern: It seems that patient has a long-standing history of passing firm her stools, even at a young age.  Frequency of her bowel pattern has not been consistent with constipation.  Interesting that her variation in her bowel pattern improved with physical therapy for pelvic floor dysfunction related to straining for full evacuation of urine.  This certainly would put her at increased risk for pelvic floor dysfunction that would contribute to incomplete evacuation of stool.  Patient is very concerned regarding colon cancer given her cousin diagnosed at an early age.  Patient would like to proceed with a colonoscopy given that these changes over the last few years, which I have agreed to order.  Laboratory studies to include CBC and celiac markers (TTG IgA and IgG) and total IgA to be completed.  Random biopsies to be obtained during colonoscopy.  If her celiac markers are elevated, we will then add an EGD to her colonoscopy.  If the above is unremarkable, consider evaluation for pelvic floor dysfunction.  I also encourage patient to pay close attention to how she feels after the colonoscopy prep.  If there is a significant improvement in her symptoms, initiation of MiraLAX and comities with soluble fiber may allow for improved regularity.  If no significant change, she may benefit from initiation of  "soluble fiber to improve consistency of the stool.  --Colonoscopy to be scheduled  --Lab work to include CBC and celiac markers    2. Colorectal cancer screening: Patient has family history with cousin diagnosed with colon cancer in her early 30s.  Proceeding with colonoscopy in the near future and can then determine surveillance screening plan.    RTC 3 months    Thank you for this consultation.  It was a pleasure to participate in the care of this patient; please contact us with any further questions.     William Moy PA-C  Division of Gastroenterology, Hepatology and Nutrition  [facility]    HPI  38-year-old female with no significant past medical history who presents to the GI clinic for consultation regarding change in bowel habits over the last 3 to 5 years.  Patient notes that she is had problems passing more firm stools her whole life and noticed a change to softer occasionally looser stools over the last few years.  She denies any liquid watery stools.  She denies any blood in the stool.  She has excess flatulence with foul odor, especially by the end of the day.  She had actually been evaluated for pelvic floor dysfunction due to straining for full evacuation of urine.  Physical therapy was recommended at that time.  She noted when she had been diligent on pelvic floor relaxation exercises, this allowed for improvement in her bowel pattern, decreasing her episodes to 1-2 times in that month from previously having looser stools several times per week.  She remembers being told that instead of relaxing her pelvic floor muscles, she was \"holding it in.\"    Over the last 6 months her average bowel pattern is 2 soft bowel movements per day without blood.  She may have occasional abdominal cramping before bowel movement that is relieved with gas and passage of stool.  She denies any fever weight loss or extraintestinal manifestations. She had not tried any dietary modifications or bowel medications " to improve consistency or regularity.    Of note, patient mentions that her cousin had been diagnosed with colon cancer in her early 30s and has now discontinued all therapies at this time. She is tearful with this news and fears colon cancer.    ROS:    No fevers or chills  No weight loss  No blurry vision, double vision or change in vision  No sore throat  No lymphadenopathy  No headache, paraesthesias, or weakness in a limb  No shortness of breath or wheezing  No chest pain or pressure  No arthralgias or myalgias  No rashes or skin changes  No odynophagia or dysphagia  No BRBPR, hematochezia, melena  No dysuria, frequency or urgency  No hot/cold intolerance or polyria  + anxiety (related to child)    PROBLEM LIST  Patient Active Problem List    Diagnosis Date Noted     Diarrhea, unspecified type 2018     Priority: Medium     NO ACTIVE PROBLEMS 2017     Priority: Medium       PERTINENT PAST MEDICAL HISTORY:  Past Medical History:   Diagnosis Date     Chronic diarrhea      H/O calculus of kidney during pregnancy      Kidney stone     With pregnancy 6 years ago     Migraines     Visual migraines     Other bladder disorder      Stomach problems     Ongoing for last few years     Vision disorder Dryness in eyes     PREVIOUS SURGERIES:  Past Surgical History:   Procedure Laterality Date     ABDOMEN SURGERY       section     BIOPSY      Lipoma removal in R forearm     EXC SKIN BENIG 0.5CM OR LESS TRUNK,ARM,LEG Right     lipoma benign       PREVIOUS ENDOSCOPY:  None    ALLERGIES:     Allergies   Allergen Reactions     Amoxicillin Rash       PERTINENT MEDICATIONS:    Current Outpatient Medications:      drospirenone-ethinyl estradiol (JOLIE) 3-0.03 MG per tablet, Take 1 tablet by mouth daily, Disp: 84 tablet, Rfl: 3     drospirenone-ethinyl estradiol (JOLIE) 3-0.03 MG tablet, Take 1 tablet by mouth daily, Disp: 90 tablet, Rfl: 3     MULTIPLE VITAMIN PO, , Disp: , Rfl:      Probiotic Product  (PROBIOTIC PO), , Disp: , Rfl:     SOCIAL HISTORY:  Social History     Socioeconomic History     Marital status:      Spouse name: Not on file     Number of children: Not on file     Years of education: Not on file     Highest education level: Not on file   Occupational History     Not on file   Social Needs     Financial resource strain: Not on file     Food insecurity:     Worry: Not on file     Inability: Not on file     Transportation needs:     Medical: Not on file     Non-medical: Not on file   Tobacco Use     Smoking status: Never Smoker     Smokeless tobacco: Never Used   Substance and Sexual Activity     Alcohol use: Yes     Comment: socially     Drug use: No     Sexual activity: Yes     Partners: Male     Birth control/protection: Pill   Lifestyle     Physical activity:     Days per week: Not on file     Minutes per session: Not on file     Stress: Not on file   Relationships     Social connections:     Talks on phone: Not on file     Gets together: Not on file     Attends Faith service: Not on file     Active member of club or organization: Not on file     Attends meetings of clubs or organizations: Not on file     Relationship status: Not on file     Intimate partner violence:     Fear of current or ex partner: Not on file     Emotionally abused: Not on file     Physically abused: Not on file     Forced sexual activity: Not on file   Other Topics Concern     Parent/sibling w/ CABG, MI or angioplasty before 65F 55M? Not Asked   Social History Narrative     Not on file       FAMILY HISTORY:  FH of CRC: Cousin with colorectal cancer dx in 30s  FH of IBD: None  Family History   Problem Relation Age of Onset     Hypertension Mother      Osteoporosis Mother      Lung Cancer Father 55        passed 56yrs     Other Cancer Father      Other Cancer Maternal Grandfather         Basal Cell Carcinoma     Heart Disease Paternal Grandmother      Heart Disease Paternal Grandfather      Colon Cancer Cousin   "    Osteoporosis Maternal Grandmother    Past/family/social history reviewed and no changes    PHYSICAL EXAMINATION:  Constitutional: aaox3, cooperative, pleasant, not dyspneic/diaphoretic, no acute distress  Vitals reviewed: BP (!) 149/98   Pulse 75   Ht 1.6 m (5' 3\")   Wt 61.6 kg (135 lb 12.8 oz)   SpO2 96%   BMI 24.06 kg/m     Wt:   Wt Readings from Last 2 Encounters:   06/07/19 61.6 kg (135 lb 12.8 oz)   02/27/19 59.9 kg (132 lb)      Eyes: Sclera anicteric/injected  Ears/nose/mouth/throat: Normal oropharynx without ulcers or exudate, mucus membranes moist, hearing intact  Neck: supple, thyroid normal size  CV: No edema  Respiratory: Unlabored breathing  Lymph: No axillary, submandibular, supraclavicular or inguinal lymphadenopathy  Abd: Nondistended, +bs, no hepatosplenomegaly, nontender, no peritoneal signs  Skin: warm, perfused, no jaundice  Psych: Normal affect  MSK: Normal gait      PERTINENT STUDIES:    Office Visit on 02/27/2019   Component Date Value Ref Range Status     Hemoglobin A1C 02/27/2019 4.9  0 - 5.6 % Final     Cholesterol 02/27/2019 221* <200 mg/dL Final     Triglycerides 02/27/2019 84  <150 mg/dL Final     HDL Cholesterol 02/27/2019 105  >49 mg/dL Final     LDL Cholesterol Calculated 02/27/2019 99  <100 mg/dL Final     Non HDL Cholesterol 02/27/2019 116  <130 mg/dL Final       William Moy PA-C      "

## 2019-06-07 NOTE — NURSING NOTE
"Chief Complaint   Patient presents with     New Patient     New consult, diarrhea       Vitals:    06/07/19 1333   BP: (!) 149/98   Pulse: 75   SpO2: 96%   Weight: 61.6 kg (135 lb 12.8 oz)   Height: 1.6 m (5' 3\")       Body mass index is 24.06 kg/m .    Sandhya Domingo CMA    "

## 2019-06-07 NOTE — PATIENT INSTRUCTIONS
It was a pleasure taking care of you today.  I've included a brief summary of our discussion and care plan from today's visit below.  Please review this information with your primary care provider.  ______________________________________________________________________    My recommendations are summarized as follows:    -- Colonoscopy to be scheduled  -- Labs today    -- Pay close attention to how you feel after the colonoscopy. If bloating and regularity improve, would recommend regular use of Miralax in combination with soluble fiber. If no significant change after clean out would start fiber supplementation on a daily basis (Metamucil, citrucel or benefiber) to improve regularity.  Start with 1 tablespoon per day and if tolerated, may increase up to 2-3 tablespoons per day.  You may experience some bloating with initiation of fiber supplementation that will improve over the first month.  A good fiber trial to evaluate the effect is 3-6 months.    Return to GI Clinic in 3 months to review your progress.    ______________________________________________________________________    Who do I call with any questions after my visit?  Please be in touch if there are any further questions that arise following today's visit.  There are multiple ways to contact your gastroenterology care team.        During business hours, you may reach a Gastroenterology nurse at [phone], option 3.       To schedule or reschedule an appointment, please call [phone].       You can always send a secure message through [employer].  [employer] messages are answered by your nurse or doctor typically within 24 hours.  Please allow extra time on weekends and holidays.        For urgent/emergent questions after business hours, you may reach the on-call GI Fellow by contacting the [facility] at [phone].      In order for your refill to be processed in a timely fashion, it is your responsibility to ensure you follow the  recommendations from your provider regarding your laboratory studies and follow up appointments.       How will I get the results of any tests ordered?    You will receive all of your results.  If you have signed up for [employer], any tests ordered at your visit will be available to you after your physician reviews them.  Typically this takes 1-2 weeks.  If there are urgent results that require a change in your care plan, your physician or nurse will call you to discuss the next steps.      What is [employer]?  [employer] is a secure way for you to access all of your healthcare records from the [facility].  It is a web based computer program, so you can sign on to it from any location.  It also allows you to send secure messages to your care team.  I recommend signing up for [employer] access if you have not already done so and are comfortable with using a computer.      How to I schedule a follow-up visit?  If you did not schedule a follow-up visit today, please call [phone] to schedule a follow-up office visit.        Sincerely,    William Moy PA-C  [facility]  Division of Gastroenterology

## 2019-06-07 NOTE — PROGRESS NOTES
GI CLINIC VISIT    CC/REFERRING MD:  Tammy Ellis  REASON FOR CONSULTATION: change in bowel habits    ASSESSMENT/PLAN:  38-year-old female with no significant past medical history who presents to the GI clinic for consultation regarding change in bowel habits over the last 3 to 5 years.     1.  Change in bowel pattern: It seems that patient has a long-standing history of passing firm her stools, even at a young age.  Frequency of her bowel pattern has not been consistent with constipation.  Interesting that her variation in her bowel pattern improved with physical therapy for pelvic floor dysfunction related to straining for full evacuation of urine.  This certainly would put her at increased risk for pelvic floor dysfunction that would contribute to incomplete evacuation of stool.  Patient is very concerned regarding colon cancer given her cousin diagnosed at an early age.  Patient would like to proceed with a colonoscopy given that these changes over the last few years, which I have agreed to order.  Laboratory studies to include CBC and celiac markers (TTG IgA and IgG) and total IgA to be completed.  Random biopsies to be obtained during colonoscopy.  If her celiac markers are elevated, we will then add an EGD to her colonoscopy.  If the above is unremarkable, consider evaluation for pelvic floor dysfunction.  I also encourage patient to pay close attention to how she feels after the colonoscopy prep.  If there is a significant improvement in her symptoms, initiation of MiraLAX and comities with soluble fiber may allow for improved regularity.  If no significant change, she may benefit from initiation of soluble fiber to improve consistency of the stool.  --Colonoscopy to be scheduled  --Lab work to include CBC and celiac markers    2. Colorectal cancer screening: Patient has family history with cousin diagnosed with colon cancer in her early 30s.  Proceeding with colonoscopy in the near future and  "can then determine surveillance screening plan.    RTC 3 months    Thank you for this consultation.  It was a pleasure to participate in the care of this patient; please contact us with any further questions.       William Moy PA-C  Division of Gastroenterology, Hepatology and Nutrition  [facility]      HPI  38-year-old female with no significant past medical history who presents to the GI clinic for consultation regarding change in bowel habits over the last 3 to 5 years.  Patient notes that she is had problems passing more firm stools her whole life and noticed a change to softer occasionally looser stools over the last few years.  She denies any liquid watery stools.  She denies any blood in the stool.  She has excess flatulence with foul odor, especially by the end of the day.  She had actually been evaluated for pelvic floor dysfunction due to straining for full evacuation of urine.  Physical therapy was recommended at that time.  She noted when she had been diligent on pelvic floor relaxation exercises, this allowed for improvement in her bowel pattern, decreasing her episodes to 1-2 times in that month from previously having looser stools several times per week.  She remembers being told that instead of relaxing her pelvic floor muscles, she was \"holding it in.\"    Over the last 6 months her average bowel pattern is 2 soft bowel movements per day without blood.  She may have occasional abdominal cramping before bowel movement that is relieved with gas and passage of stool.  She denies any fever weight loss or extraintestinal manifestations. She had not tried any dietary modifications or bowel medications to improve consistency or regularity.    Of note, patient mentions that her cousin had been diagnosed with colon cancer in her early 30s and has now discontinued all therapies at this time. She is tearful with this news and fears colon cancer.    ROS:    No fevers or chills  No weight loss  No " blurry vision, double vision or change in vision  No sore throat  No lymphadenopathy  No headache, paraesthesias, or weakness in a limb  No shortness of breath or wheezing  No chest pain or pressure  No arthralgias or myalgias  No rashes or skin changes  No odynophagia or dysphagia  No BRBPR, hematochezia, melena  No dysuria, frequency or urgency  No hot/cold intolerance or polyria  + anxiety (related to child)    PROBLEM LIST  Patient Active Problem List    Diagnosis Date Noted     Diarrhea, unspecified type 2018     Priority: Medium     NO ACTIVE PROBLEMS 2017     Priority: Medium       PERTINENT PAST MEDICAL HISTORY:  Past Medical History:   Diagnosis Date     Chronic diarrhea      H/O calculus of kidney during pregnancy      Kidney stone     With pregnancy 6 years ago     Migraines     Visual migraines     Other bladder disorder      Stomach problems     Ongoing for last few years     Vision disorder Dryness in eyes     PREVIOUS SURGERIES:  Past Surgical History:   Procedure Laterality Date     ABDOMEN SURGERY       section     BIOPSY      Lipoma removal in R forearm     EXC SKIN BENIG 0.5CM OR LESS TRUNK,ARM,LEG Right     lipoma benign       PREVIOUS ENDOSCOPY:  None    ALLERGIES:     Allergies   Allergen Reactions     Amoxicillin Rash       PERTINENT MEDICATIONS:    Current Outpatient Medications:      drospirenone-ethinyl estradiol (JOLIE) 3-0.03 MG per tablet, Take 1 tablet by mouth daily, Disp: 84 tablet, Rfl: 3     drospirenone-ethinyl estradiol (JOLIE) 3-0.03 MG tablet, Take 1 tablet by mouth daily, Disp: 90 tablet, Rfl: 3     MULTIPLE VITAMIN PO, , Disp: , Rfl:      Probiotic Product (PROBIOTIC PO), , Disp: , Rfl:     SOCIAL HISTORY:  Social History     Socioeconomic History     Marital status:      Spouse name: Not on file     Number of children: Not on file     Years of education: Not on file     Highest education level: Not on file   Occupational History     Not on  "file   Social Needs     Financial resource strain: Not on file     Food insecurity:     Worry: Not on file     Inability: Not on file     Transportation needs:     Medical: Not on file     Non-medical: Not on file   Tobacco Use     Smoking status: Never Smoker     Smokeless tobacco: Never Used   Substance and Sexual Activity     Alcohol use: Yes     Comment: socially     Drug use: No     Sexual activity: Yes     Partners: Male     Birth control/protection: Pill   Lifestyle     Physical activity:     Days per week: Not on file     Minutes per session: Not on file     Stress: Not on file   Relationships     Social connections:     Talks on phone: Not on file     Gets together: Not on file     Attends [employer] service: Not on file     Active member of club or organization: Not on file     Attends meetings of clubs or organizations: Not on file     Relationship status: Not on file     Intimate partner violence:     Fear of current or ex partner: Not on file     Emotionally abused: Not on file     Physically abused: Not on file     Forced sexual activity: Not on file   Other Topics Concern     Parent/sibling w/ CABG, MI or angioplasty before 65F 55M? Not Asked   Social History Narrative     Not on file       FAMILY HISTORY:  FH of CRC: Cousin with colorectal cancer dx in 30s  FH of IBD: None  Family History   Problem Relation Age of Onset     Hypertension Mother      Osteoporosis Mother      Lung Cancer Father 55        passed 56yrs     Other Cancer Father      Other Cancer Maternal Grandfather         Basal Cell Carcinoma     Heart Disease Paternal Grandmother      Heart Disease Paternal Grandfather      Colon Cancer Cousin      Osteoporosis Maternal Grandmother    Past/family/social history reviewed and no changes    PHYSICAL EXAMINATION:  Constitutional: aaox3, cooperative, pleasant, not dyspneic/diaphoretic, no acute distress  Vitals reviewed: BP (!) 149/98   Pulse 75   Ht 1.6 m (5' 3\")   Wt 61.6 kg (135 lb 12.8 " oz)   SpO2 96%   BMI 24.06 kg/m    Wt:   Wt Readings from Last 2 Encounters:   06/07/19 61.6 kg (135 lb 12.8 oz)   02/27/19 59.9 kg (132 lb)      Eyes: Sclera anicteric/injected  Ears/nose/mouth/throat: Normal oropharynx without ulcers or exudate, mucus membranes moist, hearing intact  Neck: supple, thyroid normal size  CV: No edema  Respiratory: Unlabored breathing  Lymph: No axillary, submandibular, supraclavicular or inguinal lymphadenopathy  Abd: Nondistended, +bs, no hepatosplenomegaly, nontender, no peritoneal signs  Skin: warm, perfused, no jaundice  Psych: Normal affect  MSK: Normal gait      PERTINENT STUDIES:    Office Visit on 02/27/2019   Component Date Value Ref Range Status     Hemoglobin A1C 02/27/2019 4.9  0 - 5.6 % Final     Cholesterol 02/27/2019 221* <200 mg/dL Final     Triglycerides 02/27/2019 84  <150 mg/dL Final     HDL Cholesterol 02/27/2019 105  >49 mg/dL Final     LDL Cholesterol Calculated 02/27/2019 99  <100 mg/dL Final     Non HDL Cholesterol 02/27/2019 116  <130 mg/dL Final         Answers for HPI/ROS submitted by the patient on 6/7/2019   General Symptoms: No  Skin Symptoms: No  HENT Symptoms: No  EYE SYMPTOMS: No  HEART SYMPTOMS: No  LUNG SYMPTOMS: No  INTESTINAL SYMPTOMS: Yes  URINARY SYMPTOMS: No  GYNECOLOGIC SYMPTOMS: No  BREAST SYMPTOMS: No  SKELETAL SYMPTOMS: No  BLOOD SYMPTOMS: No  NERVOUS SYSTEM SYMPTOMS: No  MENTAL HEALTH SYMPTOMS: No  Heart burn or indigestion: No  Nausea: No  Vomiting: No  Abdominal pain: No  Bloating: No  Constipation: No  Diarrhea: Yes  Blood in stool: No  Black stools: No  Rectal or Anal pain: No  Fecal incontinence: No  Yellowing of skin or eyes: No  Vomit with blood: No  Change in stools: No

## 2019-06-10 DIAGNOSIS — R19.7 DIARRHEA, UNSPECIFIED TYPE: Primary | ICD-10-CM

## 2019-06-10 LAB
IGA SERPL-MCNC: 461 MG/DL (ref 70–380)
TTG IGA SER-ACNC: 1 U/ML
TTG IGG SER-ACNC: <1 U/ML

## 2019-07-23 ENCOUNTER — HOSPITAL ENCOUNTER (OUTPATIENT)
Facility: CLINIC | Age: 39
Discharge: HOME | End: 2019-07-23
Attending: INTERNAL MEDICINE | Admitting: INTERNAL MEDICINE
Payer: COMMERCIAL

## 2019-07-23 VITALS
OXYGEN SATURATION: 98 % | RESPIRATION RATE: 12 BRPM | DIASTOLIC BLOOD PRESSURE: 79 MMHG | SYSTOLIC BLOOD PRESSURE: 110 MMHG | HEART RATE: 74 BPM | WEIGHT: 131 LBS | HEIGHT: 63 IN | BODY MASS INDEX: 23.21 KG/M2

## 2019-07-23 LAB
COLONOSCOPY: NORMAL
UPPER GI ENDOSCOPY: NORMAL

## 2019-07-23 PROCEDURE — 43239 EGD BIOPSY SINGLE/MULTIPLE: CPT | Performed by: INTERNAL MEDICINE

## 2019-07-23 PROCEDURE — 25000125 ZZHC RX 250: Performed by: INTERNAL MEDICINE

## 2019-07-23 PROCEDURE — 88305 TISSUE EXAM BY PATHOLOGIST: CPT | Performed by: INTERNAL MEDICINE

## 2019-07-23 PROCEDURE — G0500 MOD SEDAT ENDO SERVICE >5YRS: HCPCS | Performed by: INTERNAL MEDICINE

## 2019-07-23 PROCEDURE — 45380 COLONOSCOPY AND BIOPSY: CPT | Performed by: INTERNAL MEDICINE

## 2019-07-23 PROCEDURE — 99153 MOD SED SAME PHYS/QHP EA: CPT

## 2019-07-23 PROCEDURE — 88305 TISSUE EXAM BY PATHOLOGIST: CPT | Mod: 26,59 | Performed by: INTERNAL MEDICINE

## 2019-07-23 PROCEDURE — 25000128 H RX IP 250 OP 636: Performed by: INTERNAL MEDICINE

## 2019-07-23 RX ORDER — ONDANSETRON 4 MG/1
4 TABLET, ORALLY DISINTEGRATING ORAL EVERY 6 HOURS PRN
Status: DISCONTINUED | OUTPATIENT
Start: 2019-07-23 | End: 2019-07-23 | Stop reason: HOSPADM

## 2019-07-23 RX ORDER — FENTANYL CITRATE 50 UG/ML
INJECTION, SOLUTION INTRAMUSCULAR; INTRAVENOUS PRN
Status: DISCONTINUED | OUTPATIENT
Start: 2019-07-23 | End: 2019-07-23 | Stop reason: HOSPADM

## 2019-07-23 RX ORDER — NALOXONE HYDROCHLORIDE 0.4 MG/ML
.1-.4 INJECTION, SOLUTION INTRAMUSCULAR; INTRAVENOUS; SUBCUTANEOUS
Status: DISCONTINUED | OUTPATIENT
Start: 2019-07-23 | End: 2019-07-23 | Stop reason: HOSPADM

## 2019-07-23 RX ORDER — ONDANSETRON 2 MG/ML
INJECTION INTRAMUSCULAR; INTRAVENOUS PRN
Status: DISCONTINUED | OUTPATIENT
Start: 2019-07-23 | End: 2019-07-23 | Stop reason: HOSPADM

## 2019-07-23 RX ORDER — ONDANSETRON 2 MG/ML
4 INJECTION INTRAMUSCULAR; INTRAVENOUS EVERY 6 HOURS PRN
Status: DISCONTINUED | OUTPATIENT
Start: 2019-07-23 | End: 2019-07-23 | Stop reason: HOSPADM

## 2019-07-23 RX ORDER — LIDOCAINE 40 MG/G
CREAM TOPICAL
Status: DISCONTINUED | OUTPATIENT
Start: 2019-07-23 | End: 2019-07-23 | Stop reason: HOSPADM

## 2019-07-23 RX ORDER — FLUMAZENIL 0.1 MG/ML
0.2 INJECTION, SOLUTION INTRAVENOUS
Status: DISCONTINUED | OUTPATIENT
Start: 2019-07-23 | End: 2019-07-23 | Stop reason: HOSPADM

## 2019-07-23 RX ORDER — ONDANSETRON 2 MG/ML
4 INJECTION INTRAMUSCULAR; INTRAVENOUS
Status: DISCONTINUED | OUTPATIENT
Start: 2019-07-23 | End: 2019-07-23 | Stop reason: HOSPADM

## 2019-07-23 ASSESSMENT — MIFFLIN-ST. JEOR: SCORE: 1248.34

## 2019-07-23 NOTE — H&P
Pre-Endoscopy History and Physical     Melissa Severson MRN# [id number]   YOB: 1980 Age: 38 year old     Date of Procedure: 2019  Primary care provider: Syed Shook  Type of Endoscopy: Colonoscopy with possible biopsy, possible polypectomy and Gastroscopy with possible biopsy, possible dilation  Reason for Procedure: diarrhea  Type of Anesthesia Anticipated: Conscious Sedation    HPI:    Mary Ellen is a 38 year old female who will be undergoing the above procedure.      A history and physical has been performed. The patient's medications and allergies have been reviewed. The risks and benefits of the procedure and the sedation options and risks were discussed with the patient.  All questions were answered and informed consent was obtained.      She denies a personal or family history of anesthesia complications or bleeding disorders.     Patient Active Problem List   Diagnosis     NO ACTIVE PROBLEMS     Diarrhea, unspecified type        Past Medical History:   Diagnosis Date     Chronic diarrhea      H/O calculus of kidney during pregnancy      Kidney stone     With pregnancy 6 years ago     Migraines     Visual migraines     Other bladder disorder      Stomach problems     Ongoing for last few years     Vision disorder Dryness in eyes        Past Surgical History:   Procedure Laterality Date     ABDOMEN SURGERY       section     BIOPSY      Lipoma removal in R forearm     EXC SKIN BENIG 0.5CM OR LESS TRUNK,ARM,LEG Right     lipoma benign       Social History     Tobacco Use     Smoking status: Never Smoker     Smokeless tobacco: Never Used   Substance Use Topics     Alcohol use: Yes     Comment: socially       Family History   Problem Relation Age of Onset     Hypertension Mother      Osteoporosis Mother      Lung Cancer Father 55        passed 56yrs     Other Cancer Father      Other Cancer Maternal Grandfather         Basal Cell Carcinoma     Heart Disease Paternal  "Grandmother      Heart Disease Paternal Grandfather      Colon Cancer Cousin      Osteoporosis Maternal Grandmother        Prior to Admission medications    Medication Sig Start Date End Date Taking? Authorizing Provider   drospirenone-ethinyl estradiol (JOLIE) 3-0.03 MG per tablet Take 1 tablet by mouth daily 3/20/18  Yes Araseli Diaz APRN CNM   drospirenone-ethinyl estradiol (JOLIE) 3-0.03 MG tablet Take 1 tablet by mouth daily 2/27/19  Yes Tammy Ellis CNM   MULTIPLE VITAMIN PO    Yes Reported, Patient   Probiotic Product (PROBIOTIC PO)  5/7/19  Yes Reported, Patient       Allergies   Allergen Reactions     Amoxicillin Rash        REVIEW OF SYSTEMS:   5 point ROS negative except as noted above in HPI, including Gen., Resp., CV, GI &  system review.    PHYSICAL EXAM:   /87   Pulse 83   Resp 16   Ht 1.6 m (5' 3\")   Wt 59.4 kg (131 lb)   SpO2 96%   BMI 23.21 kg/m   Estimated body mass index is 23.21 kg/m  as calculated from the following:    Height as of this encounter: 1.6 m (5' 3\").    Weight as of this encounter: 59.4 kg (131 lb).   GENERAL APPEARANCE: alert, and oriented  MENTAL STATUS: alert  AIRWAY EXAM: Mallampatti Class I (visualization of the soft palate, fauces, uvula, anterior and posterior pillars)  RESP: lungs clear to auscultation - no rales, rhonchi or wheezes  CV: regular rates and rhythm  DIAGNOSTICS:    Not indicated    IMPRESSION   ASA Class 1 - Healthy patient, no medical problems    PLAN:   Plan for Colonoscopy with possible biopsy, possible polypectomy and Gastroscopy with possible biopsy, possible dilation. We discussed the risks, benefits and alternatives and the patient wished to proceed.    The above has been forwarded to the consulting provider.      Signed Electronically by: Alfonso Badillo  July 23, 2019          "

## 2019-07-23 NOTE — LETTER
July 1, 2019      Melissa Severson  [address]  [facility] [address]        Dear Mary Ellen,       Thank you for choosing [facility]. You are scheduled for the following service(s).   Please be aware that coverage of these services is subject to the terms and limitations of your health insurance plan.  Call member services at your health plan with any benefit or coverage questions.    Date:  7-23-19             Procedure:  COLONOSCOPY  Doctor:        Aby   Arrival Time:  0700  *Check in at Emergency/Endoscopy desk*  Procedure Time:  0730      Location:   [facility]        Endoscopy Department, First Floor (Enter through ER Doors) *        [address] [place], [place] [address]      [phone] or [phone] () to reschedule      MIRALAX -GATORADE  PREP  Colonoscopy is the most accurate test to detect colon polyps and colon cancer; and the only test where polyps can be removed. During this procedure, a doctor examines the lining of your large intestine and rectum through a flexible tube.   Transportation  You must arrange for a ride for the day of your procedure with a responsible adult. A taxi , Uber, etc, is not an option unless you are accompanied by a responsible adult. If you fail to arrange transportation with a responsible adult, your procedure will be cancelled and rescheduled.    Purchase the  following supplies at your local pharmacy:  - 2 (two) bisacodyl tablets: each tablet contains 5 mg.  (Dulcolax  laxative NOT Dulcolax  stool softener)   - 1 (one) 8.3 oz bottle of Polyethylene Glycol (PEG) 3350 Powder   (MiraLAX , Smooth LAX , ClearLAX  or equivalent)  - 64 oz Gatorade    Regular Gatorade, Gatorade G2 , Powerade , Powerade Zero  or Pedialyte  is acceptable. Red colored flavors are not allowed; all other colors (yellow, green, orange, purple and blue) are okay. It is also okay to buy two 2.12 oz packets of powdered Gatorade that  can be mixed with water to a total volume of 64 oz of liquid.  - 1 (one) 10 oz bottle of Magnesium Citrate (Red colored flavors are not allowed)  It is also okay for you to use a 0.5 oz package of powdered magnesium citrate (17 g) mixed with 10 oz of water.      PREPARATION FOR COLONOSCOPY    7 days before:    Discontinue fiber supplements and medications containing iron. This includes Metamucil  and Fibercon ; and multivitamins with iron.    3 days before:    Begin a low-fiber diet. A low-fiber diet helps making the cleanout more effective.     Examples of a low-fiber diet include (but are not limited to): white bread, white rice, pasta, crackers, fish, chicken, eggs, ground beef, creamy peanut butter, cooked/steamed/boiled vegetables, canned fruit, bananas, melons, milk, plain yogurt cheese, salad dressing and other condiments.     The following are not allowed on a low-fiber diet: seeds, nuts, popcorn, bran, whole wheat, corn, quinoa, raw fruits and vegetables, berries and dried fruit, beans and lentils.    For additional details on low-fiber diet, please refer to the table on the last page.    2 days before:    Continue the low-fiber diet.     Drink at least 8 glasses of water throughout the day.     Stop eating solid foods at 11:45 pm.  1.   2. 1 day before:    In the morning: begin a clear liquid diet (liquids you can see through).     Examples of a clear liquid diet include: water, clear broth or bouillon, Gatorade, Pedialyte or Powerade, carbonated and non-carbonated soft drinks (Sprite , 7-Up , ginger ale), strained fruit juices without pulp (apple, white grape, white cranberry), Jell-O  and popsicles.     The following are not allowed on a clear liquid diet: red liquids, alcoholic beverages, dairy products (milk, creamer, and yogurt), protein shakes, creamy broths, juice with pulp and chewing tobacco.    At noon: take 2 (two) bisacodyl tablets     At 4 (and no later than 6pm): start drinking the  Miralax-Gatorade preparation (8.3 oz of Miralax mixed with 64 oz of Gatorade in a large pitcher). Drink 1(one) 8 oz glass every 15 minutes thereafter, until the mixture is gone.    COLON CLEANSING TIPS: drink adequate amounts of fluids before and after your colon cleansing to prevent dehydration. Stay near a toilet because you will have diarrhea. Even if you are sitting on the toilet, continue to drink the cleansing solution every 15 minutes. If you feel nauseous or vomit, rinse your mouth with water, take a 15 to 30-minute-break and then continue drinking the solution. You will be uncomfortable until the stool has flushed from your colon (in about 2 to 4 hours). You may feel chilled.    Day of your procedure  You may take all of your morning medications including blood pressure medications, blood thinners (if you have not been instructed to stop these by our office), methadone, anti-seizure medications with sips of water 3 hours prior to your procedure or earlier. Do not take insulin or vitamins prior to your procedure. Continue the clear liquid diet.       4 hours prior: drink 10 oz of magnesium citrate. It may be easier to drink it with a straw.    STOP consuming all liquids after that.     Do not take anything by mouth during this time.     Allow extra time to travel to your procedure as you may need to stop and use a restroom along the way.    You are ready for the procedure, if you followed all instructions and your stool is no longer formed, but clear or yellow liquid. If you are unsure whether your colon is clean, please call our office at [phone] before you leave for your appointment.    Bring the following to your procedure:  - Insurance Card/Photo ID.   - List of current medications including over-the-counter medications and supplements.   - Your rescue inhaler if you currently use one to control asthma.      Canceling or rescheduling your appointment:   If you must cancel or reschedule your  appointment, please call [phone] as soon as possible.      COLONOSCOPY PRE-PROCEDURE CHECKLIST    If you have diabetes, ask your regular doctor for diet and medication restrictions.  If you take an anticoagulant or anti-platelet medication (such as Coumadin , Lovenox , Pradaxa , Xarelto , Eliquis , etc.), please call your primary doctor for advice on holding this medication.  If you take aspirin you may continue to do so.  If you are or may be pregnant, please discuss the risks and benefits of this procedure with your doctor.        What happens during a colonoscopy?    Plan to spend up to two hours, starting at registration time, at the endoscopy center the day of your procedure. The colonoscopy takes an average of 15 to 30 minutes. Recovery time is about 30 minutes.      Before the exam:    You will change into a gown.    Your medical history and medication list will be reviewed with you, unless that has been done over the phone prior to the procedure.     A nurse will insert an intravenous (IV) line into your hand or arm.    The doctor will meet with you and will give you a consent form to sign.  1. During the exam:     Medicine will be given through the IV line to help you relax.     Your heart rate and oxygen levels will be monitored. If your blood pressure is low, you may be given fluids through the IV line.     The doctor will insert a flexible hollow tube, called a colonoscope, into your rectum. The scope will be advanced slowly through the large intestine (colon).    You may have a feeling of fullness or pressure.     If an abnormal tissue or a polyp is found, the doctor may remove it through the endoscope for closer examination, or biopsy. Tissue removal is painless    After the exam:           Any tissue samples removed during the exam will be sent to a lab for evaluation. It may take 5-7 working days for you to be notified of the results.     A nurse will provide you with complete discharge  instructions before you leave the endoscopy center. Be sure to ask the nurse for specific instructions if you take blood thinners such as Aspirin, Coumadin or Plavix.     The doctor will prepare a full report for you and for the physician who referred you for the procedure.     Your doctor will talk with you about the initial results of your exam.      Medication given during the exam will prohibit you from driving for the rest of the day.     Following the exam, you may resume your normal diet. Your first meal should be light, no greasy foods. Avoid alcohol until the next day.     You may resume your regular activities the day after the procedure.         LOW-FIBER DIET    Foods RECOMMENDED Foods to AVOID   Breads, Cereal, Rice and Pasta:   White bread, rolls, biscuits, croissant and sarah toast.   Waffles, Kyrgyz toast and pancakes.   White rice, noodles, pasta, macaroni and peeled cooked potatoes.   Plain crackers and saltines.   Cooked cereals: farina, cream of rice.   Cold cereals: Puffed Rice , Rice Krispies , Corn Flakes  and Special K    Breads, Cereal, Rice and Pasta:   Breads or rolls with nuts, seeds or fruit.   Whole wheat, pumpernickel, rye breads and cornbread.   Potatoes with skin, brown or wild rice, and kasha (buckwheat).     Vegetables:   Tender cooked and canned vegetables without seeds: carrots, asparagus tips, green or wax beans, pumpkin, spinach, lima beans. Vegetables:   Raw or steamed vegetables.   Vegetables with seeds.   Sauerkraut.   Winter squash, peas, broccoli, Brussel sprouts, cabbage, onions, cauliflower, baked beans, peas and corn.   Fruits:   Strained fruit juice.   Canned fruit, except pineapple.   Ripe bananas and melon. Fruits:   Prunes and prune juice.   Raw fruits.   Dried fruits: figs, dates and raisins.   Milk/Dairy:   Milk: plain or flavored.   Yogurt, custard and ice cream.   Cheese and cottage cheese Milk/Dairy:     Meat and other proteins:   ground, well-cooked tender  beef, lamb, ham, veal, pork, fish, poultry and organ meats.   Eggs.   Peanut butter without nuts. Meat and other proteins:   Tough, fibrous meats with gristle.   Dry beans, peas and lentils.   Peanut butter with nuts.   Tofu.   Fats, Snack, Sweets, Condiments and Beverages:   Margarine, butter, oils, mayonnaise, sour cream and salad dressing, plain gravy.   Sugar, hard candy, clear jelly, honey and syrup.   Spices, cooked herbs, bouillon, broth and soups made with allowed vegetable, ketchup and mustard.   Coffee, tea and carbonated drinks.   Plain cakes, cookies and pretzels.   Gelatin, plain puddings, custard, ice cream, sherbet and popsicles. Fats, Snack, Sweets, Condiments and Beverages:   Nuts, seeds and coconut.   Jam, marmalade and preserves.   Pickles, olives, relish and horseradish.   All desserts containing nuts, seeds, dried fruit and coconut; or made from whole grains or bran.   Candy made with nuts or seeds.   Popcorn.                     DIRECTIONS TO THE ENDOSCOPY DEPARTMENT     From the north ([facility])  Take [address] South, exit on [address] [address]. Get into the left hand michela, turn left (east), go one-half mile to [address] and turn left. Go north to the first stoplight, take a right on [address] and follow it to the Emergency entrance.    From the south ([facility])  Take 35N to the [address]E split and exit on [address] [address]. On [address] [address], turn left (west) to [address]. Turn right (north) on [address]. Go north to the first stoplight, take a right on [address] and follow it to the Emergency entrance.    From the east via 35E ([facility])  Take [address] south to [address] [address] exit. Turn right on [address] [address]. Go west to [address]. Turn right (north) on [address]. Go to the first stoplight, take a right and follow on [address] to the Emergency entrance.    From the east via [address] ([facility])  [address]  to [address]. Turn left (south) on [address] to [address]. Turn left (east) on [address] and follow it to the Emergency entrance.    From the west via [address] ([place])  Take [address] east to [address]. Turn right (south) on [address] to [address]. Turn left (east) on [address] and follow it to the Emergency entrance.

## 2019-07-24 LAB — COPATH REPORT: NORMAL

## 2020-01-17 DIAGNOSIS — Z30.41 ENCOUNTER FOR SURVEILLANCE OF CONTRACEPTIVE PILLS: ICD-10-CM

## 2020-01-17 RX ORDER — DROSPIRENONE AND ETHINYL ESTRADIOL 0.03MG-3MG
1 KIT ORAL DAILY
Qty: 90 TABLET | Refills: 0 | Status: SHIPPED | OUTPATIENT
Start: 2020-01-17 | End: 2020-01-20

## 2020-01-17 NOTE — TELEPHONE ENCOUNTER
drospirenone      Last Written Prescription Date:  2/27/19  Last Fill Quantity: 90,   # refills: 3  Last Office Visit: 2/27/19  Future Office visit:    Next 5 appointments (look out 90 days)    Jan 20, 2020  8:00 AM CST  PHYS[facility] with KEVIN Martinez CNM  [facility] [address]  [address]  [facility] [id number] [phone]

## 2020-01-20 ENCOUNTER — OFFICE VISIT (OUTPATIENT)
Dept: OBGYN | Facility: CLINIC | Age: 40
End: 2020-01-20
Payer: COMMERCIAL

## 2020-01-20 VITALS — SYSTOLIC BLOOD PRESSURE: 122 MMHG | DIASTOLIC BLOOD PRESSURE: 80 MMHG | WEIGHT: 133 LBS | BODY MASS INDEX: 23.56 KG/M2

## 2020-01-20 DIAGNOSIS — E78.00 HIGH CHOLESTEROL: ICD-10-CM

## 2020-01-20 DIAGNOSIS — Z30.41 ENCOUNTER FOR SURVEILLANCE OF CONTRACEPTIVE PILLS: ICD-10-CM

## 2020-01-20 DIAGNOSIS — Z01.419 WELL WOMAN EXAM: Primary | ICD-10-CM

## 2020-01-20 PROCEDURE — 99395 PREV VISIT EST AGE 18-39: CPT | Performed by: ADVANCED PRACTICE MIDWIFE

## 2020-01-20 RX ORDER — DROSPIRENONE AND ETHINYL ESTRADIOL 0.03MG-3MG
1 KIT ORAL DAILY
Qty: 84 TABLET | Refills: 3 | Status: SHIPPED | OUTPATIENT
Start: 2020-01-20 | End: 2020-12-11

## 2020-01-20 NOTE — NURSING NOTE
"Chief Complaint   Patient presents with     Physical       Initial /80 (BP Location: Left arm, Cuff Size: Adult Regular)   Wt 60.3 kg (133 lb)   LMP 2019   BMI 23.56 kg/m   Estimated body mass index is 23.56 kg/m  as calculated from the following:    Height as of 19: 1.6 m (5' 3\").    Weight as of this encounter: 60.3 kg (133 lb).  BP completed using cuff size: regular    Questioned patient about current smoking habits.  Pt. has never smoked.        Miguelina Carlson CMA      "

## 2020-01-20 NOTE — PATIENT INSTRUCTIONS
There are a number of functional foods and dietary supplements that are promoted for cholesterol lowering. Among the most effective products are plant stanols or sterols (Benecol and others) soy proteins, and policosanol. Other products that provide some benefit include: psyllium (Metamucil), oats, and niacin (avoid over-the-counter, long-acting preparations). While other agents may be appropriate for some patients, most patients should avoid garlic due to drug interactions, and red yeast due to product quality issues.

## 2020-01-20 NOTE — PROGRESS NOTES
Mary Ellen is a 39 year old  female who presents for annual exam.     Besides routine health maintenance, she would like refills of her OCPs.  HPI:  Reviewed previous issues discussed at well woman exam last year: urinary retention and diarrhea. She has no concerns regarding these at this time. States pelvic floor therapy was very helpful and alleviated bladder concerns and improved bowel habits. Colonoscopy was normal. She has not started any diet elimination but also states diarrhea hasn't interrupted her ADLs as they had last year.  Menses are regular q 28-30 days and normal lasting 5 days.   Menses flow: normal and light.    Patient's last menstrual period was 2019..   Using oral contraceptives for contraception.    She is not currently considering pregnancy. She and  have been considering vasectomy, which they anticipate may occur within the next year.    REPRODUCTIVE/GYNECOLOGIC HISTORY:  Mary Ellen is sexually active with male partner(s) and is currently in monogamous relationship.   STI testing offered?  Declined  History of abnormal Pap smear:  No  SOCIAL HISTORY  Abuse: does not report having previously been physical or sexually abused.    Do you feel safe in your environment? YES     She  reports that she has never smoked. She has never used smokeless tobacco.    Last PHQ-9 score on record = No flowsheet data found.  Last GAD7 score on record = No flowsheet data found.  Alcohol Score = 0    HEALTH MAINTENANCE:  Cholesterol:   Cholesterol   Date Value Ref Range Status   2019 221 (H) <200 mg/dL Final     Comment:     Desirable:       <200 mg/dl   2018 206 (H) <200 mg/dL Final     Comment:     Desirable:       <200 mg/dl    History of abnormal lipids: Yes   LDL Cholesterol Calculated   Date Value Ref Range Status   2019 99 <100 mg/dL Final     Comment:     Desirable:       <100 mg/dl     HDL Cholesterol   Date Value Ref Range Status   2019 105 >49 mg/dL Final      Triglycerides   Date Value Ref Range Status   2019 84 <150 mg/dL Final     Comment:     Fasting specimen     Mammo: N/A. History of abnormal Mammo: No.  Regular Self Breast Exams: No  TSH: (No results found for: TSH )  Pap:  Lab Results   Component Value Date    PAP NIL 2017   HPV co-testing negative    Immunizations up to date: yes  Health maintenance updated:  yes    HEALTHY HABITS  Eating habits: eats regular meals and takes daily vitamins  Calcium/Vitamin D intake: source:  dietary supplement(s), cheese; otherwise doesn't prefer milk products but doesn't actively avoid them  Exercise: How often do you exercise? 5-7 times/week --cardio/[employer]ach[employer] workouts  Have you had an eye exam in the last two years? YES  Do you routinely see the dentist once or twice yearly? YES        HISTORY:  OB History    Para Term  AB Living   2 2 2 0 0 2   SAB TAB Ectopic Multiple Live Births   0 0 0 0 2      # Outcome Date GA Lbr Dk/2nd Weight Sex Delivery Anes PTL Lv   2 Term 14   2.948 kg (6 lb 8 oz) M CS-LTranv  N SHELLI      Complications: Fetal Intolerance   1 Term 12 40w2d  3.118 kg (6 lb 14 oz) F   N SHELLI     Past Medical History:   Diagnosis Date     Chronic diarrhea      H/O calculus of kidney during pregnancy      Kidney stone     With pregnancy 6 years ago     Migraines     Visual migraines     Other bladder disorder      Stomach problems     Ongoing for last few years     Vision disorder Dryness in eyes     Past Surgical History:   Procedure Laterality Date     ABDOMEN SURGERY       section     BIOPSY      Lipoma removal in R forearm     COLONOSCOPY N/A 2019    Procedure: COLONOSCOPY  with biopsies by cold forcep;  Surgeon: Alfonso Badillo MD;  Location:  GI     ESOPHAGOSCOPY, GASTROSCOPY, DUODENOSCOPY (EGD), COMBINED N/A 2019    Procedure: Esophagoscopy, gastroscopy, duodenoscopy (EGD), combined with biopsies by cold forcep;  Surgeon: Alfonso Badillo  MD;  Location: [facility]     EXC SKIN BENIG 0.5CM OR LESS TRUNK,ARM,LEG Right 2012    lipoma benign     Family History   Problem Relation Age of Onset     Hypertension Mother      Osteoporosis Mother      Lung Cancer Father 55        passed 56yrs     Other Cancer Father      Other Cancer Maternal Grandfather         Basal Cell Carcinoma     Heart Disease Paternal Grandmother      Heart Disease Paternal Grandfather      Colon Cancer Cousin      Osteoporosis Maternal Grandmother      Social History     Socioeconomic History     Marital status:      Spouse name: None     Number of children: None     Years of education: None     Highest education level: None   Occupational History     None   Social Needs     Financial resource strain: None     Food insecurity:     Worry: None     Inability: None     Transportation needs:     Medical: None     Non-medical: None   Tobacco Use     Smoking status: Never Smoker     Smokeless tobacco: Never Used   Substance and Sexual Activity     Alcohol use: Yes     Comment: socially     Drug use: No     Sexual activity: Yes     Partners: Male     Birth control/protection: Pill   Lifestyle     Physical activity:     Days per week: None     Minutes per session: None     Stress: None   Relationships     Social connections:     Talks on phone: None     Gets together: None     Attends Jain service: None     Active member of club or organization: None     Attends meetings of clubs or organizations: None     Relationship status: None     Intimate partner violence:     Fear of current or ex partner: None     Emotionally abused: None     Physically abused: None     Forced sexual activity: None   Other Topics Concern     Parent/sibling w/ CABG, MI or angioplasty before 65F 55M? Not Asked   Social History Narrative     None       Current Outpatient Medications:      drospirenone-ethinyl estradiol (JOLIE) 3-0.03 MG tablet, Take 1 tablet by mouth daily, Disp: 84 tablet, Rfl: 3     MULTIPLE  VITAMIN PO, , Disp: , Rfl:      Probiotic Product (PROBIOTIC PO), , Disp: , Rfl:      Allergies   Allergen Reactions     Amoxicillin Rash       Past medical, surgical, social and family history were reviewed and updated in [facility].    ROS:   CONSTITUTIONAL: NEGATIVE for fever, chills, change in weight  INTEGUMENTARU/SKIN: NEGATIVE for worrisome rashes, moles or lesions  EYES: NEGATIVE for vision changes or irritation  ENT: NEGATIVE for ear, mouth and throat problems  RESP: NEGATIVE for significant cough or SOB  BREAST: NEGATIVE for masses, tenderness or discharge  CV: NEGATIVE for chest pain, palpitations or peripheral edema  GI: NEGATIVE for nausea, abdominal pain, heartburn, or change in bowel habits  : NEGATIVE for unusual urinary or vaginal symptoms. Periods are regular.  MUSCULOSKELETAL: NEGATIVE for significant arthralgias or myalgia  NEURO: NEGATIVE for weakness, dizziness or paresthesias  PSYCHIATRIC: NEGATIVE for changes in mood or affect    PHYSICAL EXAM:  /80 (BP Location: Left arm, Cuff Size: Adult Regular)   Wt 60.3 kg (133 lb)   LMP 01/30/2019   BMI 23.56 kg/m     BMI: Body mass index is 23.56 kg/m .  Constitutional: healthy, alert and no distress  Neck: symmetrical, thyroid normal size, no masses present, no lymphadenopathy present.   Cardiovascular: RRR, no murmurs present  Respiratory: breathing unlabored, lungs CTA bilaterally  Breast:normal without masses, tenderness or nipple discharge and no palpable axillary masses or adenopathy  Gastrointestinal: abdomen soft, non-tender, bowel sounds present  PELVIC EXAM: deferred    ASSESSMENT:    ICD-10-CM    1. Well woman exam Z01.419    2. Encounter for surveillance of contraceptive pills Z30.41 drospirenone-ethinyl estradiol (JOLIE) 3-0.03 MG tablet   3. High cholesterol E78.00      No results found for any visits on 01/20/20.    PLAN:  1. Pap UTD; next due with HPV co-testing in 2022  2. OCP refills sent to pharmacy on file  3. Given normal  lipids besides total cholesterol and excellent HDL level, plan to recheck fasting lipids at next annual exam.     Return to clinic 1 year for well woman exam.       COUNSELING:   Reviewed preventative health counseling  Special attention given to:        Regular exercise       Healthy diet/nutrition       Contraception   Discussed breast awareness vs self breast exams  Suggested combining a daily calcium supplement with multivitamin  Written information on non-pharmacological cholesterol lowering methods given.      KEVIN Christie, CNM

## 2020-03-10 ENCOUNTER — HEALTH MAINTENANCE LETTER (OUTPATIENT)
Age: 40
End: 2020-03-10

## 2020-12-11 ENCOUNTER — OFFICE VISIT (OUTPATIENT)
Dept: OBGYN | Facility: CLINIC | Age: 40
End: 2020-12-11
Payer: COMMERCIAL

## 2020-12-11 VITALS
DIASTOLIC BLOOD PRESSURE: 78 MMHG | SYSTOLIC BLOOD PRESSURE: 114 MMHG | HEIGHT: 63 IN | BODY MASS INDEX: 25.52 KG/M2 | WEIGHT: 144 LBS

## 2020-12-11 DIAGNOSIS — Z00.00 ANNUAL PHYSICAL EXAM: Primary | ICD-10-CM

## 2020-12-11 DIAGNOSIS — D22.9 BENIGN MOLE: ICD-10-CM

## 2020-12-11 DIAGNOSIS — Z30.41 ENCOUNTER FOR SURVEILLANCE OF CONTRACEPTIVE PILLS: ICD-10-CM

## 2020-12-11 PROCEDURE — 99395 PREV VISIT EST AGE 18-39: CPT | Performed by: ADVANCED PRACTICE MIDWIFE

## 2020-12-11 RX ORDER — DROSPIRENONE AND ETHINYL ESTRADIOL 0.03MG-3MG
1 KIT ORAL DAILY
Qty: 84 TABLET | Refills: 4 | Status: SHIPPED | OUTPATIENT
Start: 2020-12-11 | End: 2023-01-13

## 2020-12-11 ASSESSMENT — MIFFLIN-ST. JEOR: SCORE: 1297.31

## 2020-12-11 NOTE — PROGRESS NOTES
"Mary Ellen is a 40 year old  female who presents for annual exam.     Besides routine health maintenance, she has no other health concerns today .  HPI:.  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   is currently working from home, and they love it!  Pay for attention to self-care through drifting, working with [employer] accounts, and camping with her family.  Menses are regular q 28-30 days and normal lasting 5 days.   Menses flow: light.    Patient's last menstrual period was 2020 (exact date)..   Using oral contraceptives for contraception.    She is not currently considering pregnancy.    REPRODUCTIVE/GYNECOLOGIC HISTORY:  Mary Ellen is sexually active with male partner(s) and is currently in monogamous relationship.   STI testing offered?  Declined  History of abnormal Pap smear:  No  SOCIAL HISTORY  Abuse: does not report having previously been physical or sexually abused.    Do you feel safe in your environment? YES       She  reports that she has never smoked. She has never used smokeless tobacco.      Last PHQ-9 score on record = No flowsheet data found.  Last GAD7 score on record = No flowsheet data found.      HEALTH MAINTENANCE:  Cholesterol: (  Cholesterol   Date Value Ref Range Status   2019 221 (H) <200 mg/dL Final     Comment:     Desirable:       <200 mg/dl   2018 206 (H) <200 mg/dL Final     Comment:     Desirable:       <200 mg/dl    History of abnormal lipids: Yes  Mammo: None. History of abnormal Mammo: No  Regular Self Breast Exams: No  TSH: (No results found for: TSH )  Pap; (  Lab Results   Component Value Date    PAP NIL 2017    )  Immunizations up to date: yes  (Gardasil, Tdap, Flu)  Health maintenance updated:  yes    HEALTHY HABITS  Eating habits: \"Medium healthy diet \"  Calcium/Vitamin D intake: source:  dairy   Exercise: How often do you exercise?  Not currently exercising.  Wants to get back into doing this.  Has " online beach body workouts, but needs to restart.  Discussed ways to help motivate this.  Have you had an eye exam in the last two years? YES       HISTORY:  OB History    Para Term  AB Living   2 2 2 0 0 2   SAB TAB Ectopic Multiple Live Births   0 0 0 0 2      # Outcome Date GA Lbr Dk/2nd Weight Sex Delivery Anes PTL Lv   2 Term 14   2.948 kg (6 lb 8 oz) M CS-LTranv  N SHELLI      Complications: Fetal Intolerance   1 Term 12 40w2d  3.118 kg (6 lb 14 oz) F   N SHELLI     Past Medical History:   Diagnosis Date     Chronic diarrhea      H/O calculus of kidney during pregnancy      Migraines     Visual migraines     Stomach problems     Ongoing for last few years     Vision disorder Dryness in eyes     Past Surgical History:   Procedure Laterality Date     ABDOMEN SURGERY       section     BIOPSY      Lipoma removal in R forearm     COLONOSCOPY N/A 2019    Procedure: COLONOSCOPY  with biopsies by cold forcep;  Surgeon: Alfonso Badillo MD;  Location: [facility]     ESOPHAGOSCOPY, GASTROSCOPY, DUODENOSCOPY (EGD), COMBINED N/A 2019    Procedure: Esophagoscopy, gastroscopy, duodenoscopy (EGD), combined with biopsies by cold forcep;  Surgeon: Alfonso Badillo MD;  Location: [facility]     EXC SKIN BENIG 0.5CM OR LESS TRUNK,ARM,LEG Right     lipoma benign     Family History   Problem Relation Age of Onset     Hypertension Mother      Osteoporosis Mother      Lung Cancer Father 55        passed 56yrs     Other Cancer Father      No Known Problems Sister      No Known Problems Brother      Other Cancer Maternal Grandfather         Basal Cell Carcinoma     Heart Disease Paternal Grandmother      Heart Disease Paternal Grandfather      Colon Cancer Cousin      Osteoporosis Maternal Grandmother      No Known Problems Daughter      No Known Problems Son      Social History     Socioeconomic History     Marital status:      Spouse name: Not on file     Number of children: Not on  file     Years of education: Not on file     Highest education level: Not on file   Occupational History     Not on file   Social Needs     Financial resource strain: Not on file     Food insecurity     Worry: Not on file     Inability: Not on file     Transportation needs     Medical: Not on file     Non-medical: Not on file   Tobacco Use     Smoking status: Never Smoker     Smokeless tobacco: Never Used   Substance and Sexual Activity     Alcohol use: Yes     Comment: socially     Drug use: No     Sexual activity: Yes     Partners: Male     Birth control/protection: Pill   Lifestyle     Physical activity     Days per week: Not on file     Minutes per session: Not on file     Stress: Not on file   Relationships     Social connections     Talks on phone: Not on file     Gets together: Not on file     Attends Confucianist service: Not on file     Active member of club or organization: Not on file     Attends meetings of clubs or organizations: Not on file     Relationship status: Not on file     Intimate partner violence     Fear of current or ex partner: Not on file     Emotionally abused: Not on file     Physically abused: Not on file     Forced sexual activity: Not on file   Other Topics Concern     Parent/sibling w/ CABG, MI or angioplasty before 65F 55M? Not Asked   Social History Narrative     Not on file       Current Outpatient Medications:      drospirenone-ethinyl estradiol (JOLIE) 3-0.03 MG tablet, Take 1 tablet by mouth daily, Disp: 84 tablet, Rfl: 3     MULTIPLE VITAMIN PO, , Disp: , Rfl:      Probiotic Product (PROBIOTIC PO), , Disp: , Rfl:      Allergies   Allergen Reactions     Amoxicillin Rash       Past medical, surgical, social and family history were reviewed and updated in [facility].    ROS:   CONSTITUTIONAL: NEGATIVE for fever, chills, change in weight  INTEGUMENTARU/SKIN: NEGATIVE for worrisome rashes, moles or lesions, has a mole on her right arm, would prefer to get it checked, but not until Covid  "is over  EYES: NEGATIVE for vision changes or irritation  ENT: NEGATIVE for ear, mouth and throat problems  RESP: NEGATIVE for significant cough or SOB  BREAST: NEGATIVE for masses, tenderness or discharge  CV: NEGATIVE for chest pain, palpitations or peripheral edema  GI: NEGATIVE for nausea, abdominal pain, heartburn, positive for frequent diarrhea.  Has had colonoscopy and endoscopy.  Wants to start doing food elimination to see if this is helpful.  : NEGATIVE for unusual urinary or vaginal symptoms. Periods are regular.  MUSCULOSKELETAL: NEGATIVE for significant arthralgias or myalgia  NEURO: NEGATIVE for weakness, dizziness or paresthesias  PSYCHIATRIC: NEGATIVE for changes in mood or affect    PHYSICAL EXAM:  /78 (BP Location: Left arm, Cuff Size: Adult Regular)   Ht 1.6 m (5' 3\")   Wt 65.3 kg (144 lb)   LMP 12/03/2020 (Exact Date)   Breastfeeding No   BMI 25.51 kg/m     BMI: Body mass index is 25.51 kg/m .  Constitutional: healthy, alert and no distress  Neck: symmetrical, thyroid normal size, no masses present, no lymphadenopathy present.   Cardiovascular: RRR, no murmurs present  Respiratory: breathing unlabored, lungs CTA bilaterally  Breast:normal without masses, tenderness or nipple discharge and no palpable axillary masses or adenopathy  Gastrointestinal: abdomen soft, non-tender, bowel sounds present  PELVIC EXAM: Deferred    ASSESSMENT/PLAN:    ICD-10-CM    1. Annual physical exam  Z00.00    2. Encounter for surveillance of contraceptive pills  Z30.41 drospirenone-ethinyl estradiol (JOLIE) 3-0.03 MG tablet     No results found for any visits on 12/11/20.      COUNSELING:   Reviewed preventive health counseling, as reflected in patient instructions       Regular exercise       Healthy diet/nutrition       Contraception       Osteoporosis prevention/bone health   reports that she has never smoked. She has never used smokeless tobacco.     Goals: To eat a more healthy diet, and to begin her " exercise program again    Plan: Dermatology referral today, order mammogram at next year's visit, lipids at next years visit, Tdap within the next 4 years      Susy Mahajan CNM

## 2020-12-11 NOTE — NURSING NOTE
"Chief Complaint   Patient presents with     Physical     Contraception     Refill Birth control pills       Initial /78 (BP Location: Left arm, Cuff Size: Adult Regular)   Ht 1.6 m (5' 3\")   Wt 65.3 kg (144 lb)   LMP 2020 (Exact Date)   Breastfeeding No   BMI 25.51 kg/m   Estimated body mass index is 25.51 kg/m  as calculated from the following:    Height as of this encounter: 1.6 m (5' 3\").    Weight as of this encounter: 65.3 kg (144 lb).  BP completed using cuff size: regular    Questioned patient about current smoking habits.  Pt. has never smoked.          The following HM Due: NONE      Carlos Nuñez MA    "

## 2021-10-09 ENCOUNTER — HEALTH MAINTENANCE LETTER (OUTPATIENT)
Age: 41
End: 2021-10-09

## 2022-01-21 ENCOUNTER — OFFICE VISIT (OUTPATIENT)
Dept: OBGYN | Facility: CLINIC | Age: 42
End: 2022-01-21
Payer: COMMERCIAL

## 2022-01-21 VITALS
WEIGHT: 138.3 LBS | BODY MASS INDEX: 24.5 KG/M2 | SYSTOLIC BLOOD PRESSURE: 126 MMHG | DIASTOLIC BLOOD PRESSURE: 86 MMHG | HEIGHT: 63 IN

## 2022-01-21 DIAGNOSIS — Z30.41 ENCOUNTER FOR SURVEILLANCE OF CONTRACEPTIVE PILLS: ICD-10-CM

## 2022-01-21 DIAGNOSIS — Z13.29 SCREENING FOR THYROID DISORDER: ICD-10-CM

## 2022-01-21 DIAGNOSIS — Z12.4 PAP SMEAR FOR CERVICAL CANCER SCREENING: ICD-10-CM

## 2022-01-21 DIAGNOSIS — Z12.39 ENCOUNTER FOR SCREENING BREAST EXAMINATION: ICD-10-CM

## 2022-01-21 DIAGNOSIS — Z01.419 WOMEN'S ANNUAL ROUTINE GYNECOLOGICAL EXAMINATION: Primary | ICD-10-CM

## 2022-01-21 DIAGNOSIS — Z13.220 SCREENING FOR HYPERLIPIDEMIA: ICD-10-CM

## 2022-01-21 PROCEDURE — 87624 HPV HI-RISK TYP POOLED RSLT: CPT | Performed by: ADVANCED PRACTICE MIDWIFE

## 2022-01-21 PROCEDURE — 80061 LIPID PANEL: CPT | Performed by: ADVANCED PRACTICE MIDWIFE

## 2022-01-21 PROCEDURE — G0145 SCR C/V CYTO,THINLAYER,RESCR: HCPCS | Performed by: ADVANCED PRACTICE MIDWIFE

## 2022-01-21 PROCEDURE — 99396 PREV VISIT EST AGE 40-64: CPT | Performed by: ADVANCED PRACTICE MIDWIFE

## 2022-01-21 PROCEDURE — 36415 COLL VENOUS BLD VENIPUNCTURE: CPT | Performed by: ADVANCED PRACTICE MIDWIFE

## 2022-01-21 PROCEDURE — 84443 ASSAY THYROID STIM HORMONE: CPT | Performed by: ADVANCED PRACTICE MIDWIFE

## 2022-01-21 RX ORDER — DROSPIRENONE AND ETHINYL ESTRADIOL 0.03MG-3MG
1 KIT ORAL DAILY
Qty: 84 TABLET | Refills: 4 | Status: SHIPPED | OUTPATIENT
Start: 2022-01-21 | End: 2023-01-13

## 2022-01-21 ASSESSMENT — MIFFLIN-ST. JEOR: SCORE: 1266.45

## 2022-01-21 NOTE — NURSING NOTE
"Chief Complaint   Patient presents with     Women's Health     Last pap 2017: NIL/HPV negative     Contraception     Needs refills of birth control pills       Initial /86 (BP Location: Right arm, Cuff Size: Adult Regular)   Ht 1.6 m (5' 3\")   Wt 62.7 kg (138 lb 4.8 oz)   LMP 2021 (Approximate)   Breastfeeding No   BMI 24.50 kg/m   Estimated body mass index is 24.5 kg/m  as calculated from the following:    Height as of this encounter: 1.6 m (5' 3\").    Weight as of this encounter: 62.7 kg (138 lb 4.8 oz).  BP completed using cuff size: regular    Questioned patient about current smoking habits.  Pt. has never smoked.          The following HM Due: pap smear      "

## 2022-01-21 NOTE — PROGRESS NOTES
Mary Ellen is a 41 year old  female who presents for annual exam.     HPI: Besides routine health maintenance, she would like to refill her OCPs. Patient inquired about best next steps regarding increased salivation. Patient advised to contact ENT provider for further direction as this is who was previously working up this issue.   Menses are regular q 28-30 days and normal lasting 5 days.   Menses flow: normal.    Patient's last menstrual period was 2021 (approximate)..   Using oral contraceptives for contraception.    She is not currently considering pregnancy. She states her  is planning on getting a vasectomy within the next few years.    REPRODUCTIVE/GYNECOLOGIC HISTORY:  Mary Ellen is sexually active with male partner(s) and is currently in monogamous relationship.   STI testing offered?  Declined  History of abnormal Pap smear:  No  SOCIAL HISTORY  Abuse: does not report having previously been physical or sexually abused.    Do you feel safe in your environment? YES      She  reports that she has never smoked. She has never used smokeless tobacco.    Last PHQ-9 score on record = No flowsheet data found.  Last GAD7 score on record = No flowsheet data found.  Alcohol Score = 3-5 drinks/wk    HEALTH MAINTENANCE:  Cholesterol: (  Cholesterol   Date Value Ref Range Status   2019 221 (H) <200 mg/dL Final     Comment:     Desirable:       <200 mg/dl   2018 206 (H) <200 mg/dL Final     Comment:     Desirable:       <200 mg/dl   History of abnormal lipids: Yes  Mammo: No abnormal mammogram; baseline mammogram at 37 y.o. was negative.   Regular Self Breast Exams: No  TSH: (No results found for: TSH )  Pap;   Lab Results   Component Value Date    PAP NIL 2017      Immunizations up to date: yes  (Gardasil, Tdap, Flu)  Health maintenance updated:  yes    HEALTHY HABITS  Eating habits: healthy diet and takes daily vitamins  Calcium/Vitamin D intake: source:  Dairy  Exercise: How often do you  exercise? Rarely, states she wants to get back into exercising soon  Have you had an eye exam in the last two years? YES    Do you routinely see the dentist once or twice yearly? YES       HISTORY:  OB History    Para Term  AB Living   2 2 2 0 0 2   SAB IAB Ectopic Multiple Live Births   0 0 0 0 2      # Outcome Date GA Lbr Dk/2nd Weight Sex Delivery Anes PTL Lv   2 Term 14   2.948 kg (6 lb 8 oz) M CS-LTranv  N SHELLI      Complications: Fetal Intolerance   1 Term 12 40w2d  3.118 kg (6 lb 14 oz) F   N SHELLI     Past Medical History:   Diagnosis Date     Chronic diarrhea      H/O calculus of kidney during pregnancy      Migraines     Visual migraines     Stomach problems     Ongoing for last few years     Vision disorder Dryness in eyes     Past Surgical History:   Procedure Laterality Date     ABDOMEN SURGERY       section     BIOPSY      Lipoma removal in R forearm     COLONOSCOPY N/A 2019    Procedure: COLONOSCOPY  with biopsies by cold forcep;  Surgeon: Alfonso Badillo MD;  Location: [facility]     ESOPHAGOSCOPY, GASTROSCOPY, DUODENOSCOPY (EGD), COMBINED N/A 2019    Procedure: Esophagoscopy, gastroscopy, duodenoscopy (EGD), combined with biopsies by cold forcep;  Surgeon: Alfonso Badillo MD;  Location: [facility]     EXC SKIN BENIG 0.5CM OR LESS TRUNK,ARM,LEG Right     lipoma benign     Family History   Problem Relation Age of Onset     Hypertension Mother      Osteoporosis Mother      Lung Cancer Father 55        passed 56yrs     Other Cancer Father      No Known Problems Sister      No Known Problems Brother      Other Cancer Maternal Grandfather         Basal Cell Carcinoma     Heart Disease Paternal Grandmother      Heart Disease Paternal Grandfather      Colon Cancer Cousin      Osteoporosis Maternal Grandmother      No Known Problems Daughter      No Known Problems Son      Social History     Socioeconomic History     Marital status:      Spouse name:  None     Number of children: None     Years of education: None     Highest education level: None   Occupational History     None   Tobacco Use     Smoking status: Never Smoker     Smokeless tobacco: Never Used   Vaping Use     Vaping Use: Never used   Substance and Sexual Activity     Alcohol use: Yes     Comment: socially     Drug use: No     Sexual activity: Yes     Partners: Male     Birth control/protection: Pill   Other Topics Concern     Parent/sibling w/ CABG, MI or angioplasty before 65F 55M? Not Asked   Social History Narrative     None     Social Determinants of Health     Financial Resource Strain: Not on file   Food Insecurity: Not on file   Transportation Needs: Not on file   Physical Activity: Not on file   Stress: Not on file   Social Connections: Not on file   Intimate Partner Violence: Not on file   Housing Stability: Not on file       Current Outpatient Medications:      drospirenone-ethinyl estradiol (JOLIE) 3-0.03 MG tablet, Take 1 tablet by mouth daily, Disp: 84 tablet, Rfl: 4     MULTIPLE VITAMIN PO, , Disp: , Rfl:      VITAMIN D PO, , Disp: , Rfl:      Allergies   Allergen Reactions     Amoxicillin Rash       Past medical, surgical, social and family history were reviewed and updated in [facility].    ROS:   CONSTITUTIONAL: NEGATIVE for fever, chills, change in weight  INTEGUMENTARU/SKIN: NEGATIVE for worrisome rashes, moles or lesions  EYES: NEGATIVE for vision changes or irritation  ENT: NEGATIVE for ear, mouth and throat problems  RESP: NEGATIVE for significant cough or SOB  BREAST: NEGATIVE for masses, tenderness or discharge  CV: NEGATIVE for chest pain, palpitations or peripheral edema  GI: NEGATIVE for nausea, abdominal pain, heartburn, or change in bowel habits  : NEGATIVE for unusual urinary or vaginal symptoms. Periods are regular.  MUSCULOSKELETAL: NEGATIVE for significant arthralgias or myalgia  NEURO: NEGATIVE for weakness, dizziness or paresthesias  PSYCHIATRIC: NEGATIVE for changes  "in mood or affect    PHYSICAL EXAM:  /86 (BP Location: Right arm, Cuff Size: Adult Regular)   Ht 1.6 m (5' 3\")   Wt 62.7 kg (138 lb 4.8 oz)   LMP 12/29/2021 (Approximate)   Breastfeeding No   BMI 24.50 kg/m     BMI: Body mass index is 24.5 kg/m .  Constitutional: healthy, alert and no distress  Neck: symmetrical, thyroid normal size, no masses present, no lymphadenopathy present.   Cardiovascular: RRR, no murmurs present  Respiratory: breathing unlabored, lungs CTA bilaterally  Breast:normal without masses, tenderness or nipple discharge and no palpable axillary masses or adenopathy  Gastrointestinal: abdomen soft, non-tender, bowel sounds present  PELVIC EXAM:  Vulva: No lesions, no adenopathy, BUS WNL  Vagina: Moist, pink, discharge normal  well rugated, no lesions  Cervix:smooth, pink, no visible lesions  Uterus: Normal size, non-tender, mobile  Ovaries: No masses palpated  Rectal exam: deferred    ASSESSMENT/PLAN:    ICD-10-CM    1. Women's annual routine gynecological examination  Z01.419    2. Pap smear for cervical cancer screening  Z12.4 Pap screen with HPV - recommended age 30 - 65 years   3. Encounter for surveillance of contraceptive pills  Z30.41 drospirenone-ethinyl estradiol (JOLIE) 3-0.03 MG tablet   4. Encounter for screening breast examination  Z12.39 *MA Screening Digital Bilateral   5. Screening for thyroid disorder  Z13.29 TSH with free T4 reflex     TSH with free T4 reflex   6. Screening for hyperlipidemia  Z13.220 Lipid panel reflex to direct LDL Fasting     Lipid panel reflex to direct LDL Fasting     No results found for any visits on 01/21/22.    COUNSELING:   Reviewed preventive health counseling, as reflected in patient instructions       Regular exercise       Healthy diet/nutrition       Contraception   reports that she has never smoked. She has never used smokeless tobacco.    Patient advised of current recommendations for breast cancer screening. Patient had a baseline " mammogram at the age of 37 and this was negative. She states she has no family history of breast cancer and would like to opt for a reduced screening schedule. She states she would like to have a mammogram done this year and depending on the result would like to opt for mammogram screening every 3 years while in her 40s. Advised patient that this is an acceptable plan based on the recommendations of the [place]PSTF.     Patient to follow up in 1 year for annual exam or sooner as needed.     MING Redding    I was present with the student who participated in the service and documentation of the services provided. I have verified the history and personally performed the physical exam and medical decision making as documented by the student and edited by me.  KEVIN Omalley CNM 1/21/2022 1:17 PM

## 2022-01-22 LAB
CHOLEST SERPL-MCNC: 206 MG/DL
FASTING STATUS PATIENT QL REPORTED: YES
HDLC SERPL-MCNC: 96 MG/DL
LDLC SERPL CALC-MCNC: 83 MG/DL
NONHDLC SERPL-MCNC: 110 MG/DL
TRIGL SERPL-MCNC: 136 MG/DL
TSH SERPL DL<=0.005 MIU/L-ACNC: 2.35 MU/L (ref 0.4–4)

## 2022-01-25 LAB
BKR LAB AP GYN ADEQUACY: NORMAL
BKR LAB AP GYN INTERPRETATION: NORMAL
BKR LAB AP HPV REFLEX: NORMAL
BKR LAB AP LMP: NORMAL
BKR LAB AP PREVIOUS ABNORMAL: NORMAL
PATH REPORT.COMMENTS IMP SPEC: NORMAL
PATH REPORT.COMMENTS IMP SPEC: NORMAL
PATH REPORT.RELEVANT HX SPEC: NORMAL

## 2022-01-27 LAB
HUMAN PAPILLOMA VIRUS 16 DNA: NEGATIVE
HUMAN PAPILLOMA VIRUS 18 DNA: NEGATIVE
HUMAN PAPILLOMA VIRUS FINAL DIAGNOSIS: NORMAL
HUMAN PAPILLOMA VIRUS OTHER HR: NEGATIVE

## 2022-03-18 ENCOUNTER — ANCILLARY PROCEDURE (OUTPATIENT)
Dept: MAMMOGRAPHY | Facility: CLINIC | Age: 42
End: 2022-03-18
Attending: ADVANCED PRACTICE MIDWIFE
Payer: COMMERCIAL

## 2022-03-18 DIAGNOSIS — Z12.39 ENCOUNTER FOR SCREENING BREAST EXAMINATION: ICD-10-CM

## 2022-03-18 PROCEDURE — 77067 SCR MAMMO BI INCL CAD: CPT | Mod: TC | Performed by: RADIOLOGY

## 2022-03-18 PROCEDURE — 77063 BREAST TOMOSYNTHESIS BI: CPT | Mod: TC | Performed by: RADIOLOGY

## 2022-09-11 ENCOUNTER — HEALTH MAINTENANCE LETTER (OUTPATIENT)
Age: 42
End: 2022-09-11

## 2023-01-13 ENCOUNTER — OFFICE VISIT (OUTPATIENT)
Dept: MIDWIFE SERVICES | Facility: CLINIC | Age: 43
End: 2023-01-13
Payer: COMMERCIAL

## 2023-01-13 VITALS
BODY MASS INDEX: 25.09 KG/M2 | DIASTOLIC BLOOD PRESSURE: 84 MMHG | HEIGHT: 63 IN | SYSTOLIC BLOOD PRESSURE: 126 MMHG | WEIGHT: 141.6 LBS

## 2023-01-13 DIAGNOSIS — Z30.41 ENCOUNTER FOR SURVEILLANCE OF CONTRACEPTIVE PILLS: Primary | ICD-10-CM

## 2023-01-13 PROCEDURE — 99213 OFFICE O/P EST LOW 20 MIN: CPT | Performed by: ADVANCED PRACTICE MIDWIFE

## 2023-01-13 RX ORDER — DROSPIRENONE AND ETHINYL ESTRADIOL 0.03MG-3MG
1 KIT ORAL DAILY
Qty: 84 TABLET | Refills: 4 | Status: SHIPPED | OUTPATIENT
Start: 2023-01-13

## 2023-01-13 NOTE — PATIENT INSTRUCTIONS
Birth Control Pills    Combination birth control pills contain both estrogen and progestin.  There are numerous brands of birth control pills otherwise known as oral contraceptive pills (OCP's).  Each brand has a different combination of estrogen and progestin so every woman can find the one that is right for her.  OCP's are a safe and effective way to prevent pregnancy in most women.    How do OCP's work  OCP's work by several different mechanisms.  They cause changes in the cervix and the lining of the uterus.  The cervical mucus becomes thicker which will prevent the sperm from entering the cervix.  The lining of the uterus becomes thin which helps prevent an egg from attaching to it.  In combination, these events make it unlikely that you will get pregnant. It may also prevent ovulation completely.    Benefits of OCP's  May reduce your risk of:  Cancer of the uterus and ovary, ovarian cysts, pelvic infection, bone loss, benign breast disease, anemia, ectopic pregnancy and acne.  It may also decrease symptoms of PCOS (Polycystic Ovarian Syndrome). OCP's may also improve cramping during menstrual cycle and may make you cycle shorter and lighter.    How to take OCP's  You have several choices on how to start taking your OCP's:  You can start the pill on the first day of your next period  You can start the pill on the Sunday after your next period starts  You can start the pill on the first day it was prescribed no matter where you are in your cycle.  In this case, you will need to make sure you are not pregnant.    No matter when you start your first pack, you will always start your next pack on the same day you started your first pack.    You should take the pill at the same time every day.  Do not skip any pills.  If you miss any pills, are taking antibiotics or vomit, use a backup method of birth control until you get your next period.    Pills come in packs of 21, 28 or 91 pills:    21 Pills:  Take one pill at  the same time every day for 21 days.  Wait 7 days before beginning your next pack.  During these 7 days you will have your period.  28 Pills:  Take one pill at the same time every day for 28 days.  The last 7 pills in the pack do not contain estrogen/progestin.  During these 7 days you will have your period.    91 Pills:  Take one pill at the same time every day for 91 days.  The last 7 pills in the pack do not contain estrogen/progestin.  During these 7 days you will have your period.  With this method you will only have 4 periods a year.  Some women eventually have no bleeding at all.    Each pill pack comes with instructions.  Please make sure you read them and understand these instructions.      What to do if you miss a pill    Occasionally you may forget to take a pill or not take it on time.  Take the missed pill as soon as you remember.  Take the next pill at the regular time.  It is ok if you take two pills in one day.  You may feel a bit queasy or have some spotting, this is normal and should not be concerning.  If you have missed more than one pill use a back up method of birth control and call the clinic for instructions on how to proceed.    Who should not take Combined OCP's  If you have a history or have blood clots  A history of cerebral vascular accident (stroke)  If you have ischemic heart or coronary artery disease  Known of suspected breast cancer  Known or suspected pregnancy  Smoker and over age 35  Any know liver abnormality  Migraine headaches with an aura  Undiagnosed abnormal vaginal bleeding  High blood pressure    Common side effects when starting OCP's  Headache, nausea, dizziness, breakthrough bleeding, missed periods, tender breasts, depression and anxiety.  Most side effects are minor and resolve in the first few months. Take the pill with meals or at bedtime if nausea occurs.    Call or return for care in the following circumstances:    Unexpected missed periods or very heavy  bleeding  Persistent vaginal bleeding  Depression  Suspected pregnancy  Persistent side effects such as:  Nausea, irregular menses or mood changes.    Seek emergency care immediately for the following:  ACHES  Abdominal or pelvic pain  Chest pain  Severe headache   Visual disturbances  Severe leg pain or numbness or tingling of extremities    Lastly-  Use of a backup method is recommended for the first cycle  Condoms are recommended to protect against STI's  OCP's are 99% effective if take correctly.  The pill helps to keep your periods regular, lighter and shorter and reduces cramps.  If you desire a pregnancy, you may stop taking your OCPs.     Please call the clinic with questions and concerns  [facility]  [phone]

## 2023-01-13 NOTE — NURSING NOTE
"Chief Complaint   Patient presents with     Contraception     Refills of birth control pills.  has an appointment for a vasectomy scheduled.  Pap is up to date: 2022: NIL/HPV negative       Initial /84 (BP Location: Right arm, Cuff Size: Adult Regular)   Ht 1.6 m (5' 3\")   Wt 64.2 kg (141 lb 9.6 oz)   LMP 2022 (Approximate)   Breastfeeding No   BMI 25.08 kg/m   Estimated body mass index is 25.08 kg/m  as calculated from the following:    Height as of this encounter: 1.6 m (5' 3\").    Weight as of this encounter: 64.2 kg (141 lb 9.6 oz).  BP completed using cuff size: regular    Questioned patient about current smoking habits.  Pt. has never smoked.          The following HM Due: NONE    "

## 2023-01-13 NOTE — PROGRESS NOTES
SUBJECTIVE:                                                   Melissa Dawn Severson is a 42 year old who presents to clinic today for the following health issue(s):  Patient presents with:  Contraception: Refills of birth control pills.  has an appointment for a vasectomy scheduled.  Pap is up to date: 2022: NIL/HPV negative      HPI:  Mary Ellen presents today for a refill of her birth control pills. She is very happy with her current pills and would like to continue. She has no contraindications for COCs.     Patient's last menstrual period was 2022 (approximate).  Menstrual History: frequency: every 28-30 days  Patient is sexually active  .  Using oral contraceptives for contraception.   Health maintenance updated:  yes  STI infx testing offered:  Declined    Last PHQ-9 score on record = No flowsheet data found.  Last GAD7 score on record = No flowsheet data found.      Problem list and histories reviewed & adjusted, as indicated.  Additional history: as documented.    Patient Active Problem List   Diagnosis     NO ACTIVE PROBLEMS     Diarrhea, unspecified type     Past Surgical History:   Procedure Laterality Date     ABDOMEN SURGERY       section     BIOPSY      Lipoma removal in R forearm     COLONOSCOPY N/A 2019    Procedure: COLONOSCOPY  with biopsies by cold forcep;  Surgeon: Alfonso Badillo MD;  Location:  GI     ESOPHAGOSCOPY, GASTROSCOPY, DUODENOSCOPY (EGD), COMBINED N/A 2019    Procedure: Esophagoscopy, gastroscopy, duodenoscopy (EGD), combined with biopsies by cold forcep;  Surgeon: Alfonso Badillo MD;  Location:  GI     EXC SKIN BENIG 0.5CM OR LESS TRUNK,ARM,LEG Right     lipoma benign      Social History     Tobacco Use     Smoking status: Never     Smokeless tobacco: Never   Substance Use Topics     Alcohol use: Yes     Comment: socially      Problem (# of Occurrences) Relation (Name,Age of Onset)    Heart Disease (2) Paternal Grandmother,  "Paternal Grandfather    Hypertension (1) Mother (Nancy Alegria)    Osteoporosis (2) Mother (Nancy Alegria), Maternal Grandmother (Kaylen)    Other Cancer (2) Father (Tyson Alegria), Maternal Grandfather: Basal Cell Carcinoma    Colon Cancer (1) Cousin (Cindy)    Lung Cancer (1) Father (Tyson Alegria, 55): passed 56yrs    No Known Problems (4) Sister, Brother, Daughter, Son            Current Outpatient Medications   Medication Sig     drospirenone-ethinyl estradiol (JOLIE) 3-0.03 MG tablet Take 1 tablet by mouth daily     MULTIPLE VITAMIN PO      VITAMIN D PO      No current facility-administered medications for this visit.     Allergies   Allergen Reactions     Amoxicillin Rash       ROS:  CONSTITUTIONAL: NEGATIVE for fever, chills, change in weight  BREAST: NEGATIVE for masses, tenderness or discharge  CV: NEGATIVE for chest pain, palpitations or peripheral edema  GI: NEGATIVE for nausea, abdominal pain, heartburn, or change in bowel habits  : NEGATIVE for unusual urinary or vaginal symptoms. Periods are regular.  NEURO: NEGATIVE for weakness, dizziness or paresthesias  HEME/ALLERGY/IMMUNE: NEGATIVE for bleeding problems  PSYCHIATRIC: NEGATIVE for changes in mood or affect    OBJECTIVE:     /84 (BP Location: Right arm, Cuff Size: Adult Regular)   Ht 1.6 m (5' 3\")   Wt 64.2 kg (141 lb 9.6 oz)   LMP 12/29/2022 (Approximate)   Breastfeeding No   BMI 25.08 kg/m    Body mass index is 25.08 kg/m .    PHYSICAL EXAM:  Constitutional:  Appearance: Well nourished, well developed alert, in no acute distress  Chest:  Respiratory Effort:  Breathing unlabored.   Neurologic:  Mental Status:  Oriented X3.  Normal strength and tone, sensory exam grossly normal, mentation intact and speech normal.    Psychiatric:  Mentation appears normal and affect normal/bright.     In-Clinic Test Results:  No results found for this or any previous visit (from the past 24 hour(s)).    ASSESSMENT/PLAN:                                    "                     ICD-10-CM    1. Encounter for surveillance of contraceptive pills  Z30.41 drospirenone-ethinyl estradiol (JOLIE) 3-0.03 MG tablet          PLAN:    The use of the oral contraceptive pill has been fully discussed with the patient. This includes the proper method to initiate  and continue the pill, the need for regular compliance to ensure adequate contraceptive effect, the physiology which makes the pill effective, the instructions for what to do in event of a missed pill, and warnings about anticipated minor side effects such as breakthrough spotting, nausea, breast tenderness, weight changes, acne, headaches, etc. She was informed of the irregular bleeding pattern that can occur when the pill is first started or a new form is changed over for the first 2-3 months.  She has been told of the more serious potential side effects such as MI, stroke, and deep vein thrombosis, all of which are very unlikely.  She has been asked to report any signs of such serious problems immediately.   She understands and wishes to take the medication as prescribed.    KEVIN Omalley, SURI

## 2023-05-06 ENCOUNTER — HEALTH MAINTENANCE LETTER (OUTPATIENT)
Age: 43
End: 2023-05-06

## 2023-06-30 ENCOUNTER — TELEPHONE (OUTPATIENT)
Dept: MIDWIFE SERVICES | Facility: CLINIC | Age: 43
End: 2023-06-30

## 2023-06-30 NOTE — TELEPHONE ENCOUNTER
"Patient calling -     Scheduled screening/preventative mammogram that was denied because she is having pain and needs diagnostic mammogram.    Patient reports having left breast pain/ache/\"awareness\" for the last couple weeks.  Patient reports pain is at a 2 o'clock position, and is near armpit.  Patient reports she does not palpate any lumps.    Appointment for breast exam scheduled, hopeful for order for diagnostic mammogram/ultrasound.    Liliya FORDE RN    "

## 2023-07-02 NOTE — PROGRESS NOTES
swapnil  SUBJECTIVE:                                                   Melissa Dawn Severson is a 42 year old who presents to clinic today for the following health issue(s):  Patient presents with:  Breast Pain: Left side      HPI:  Mary Ellen has noticed pinpoint breastpain in the same spot for about a month. It didn;t not worsen or improve with cycle. Nothing makes it better or worse. There was no injury to area. She has not noticed any new rashes, discharge, or lumps. Last mammo in 3/2022 was normal. Dense breast tissue.     Patient's last menstrual period was 06/15/2023.  Menstrual History: frequency: every 28 days  Patient is sexually active  .  Using oral contraceptives for contraception.   Health maintenance updated:  no  STI infx testing offered:  Declined    Last PHQ-9 score on record =        No data to display              Last GAD7 score on record =        No data to display                Problem list and histories reviewed & adjusted, as indicated.  Additional history: as documented.    Patient Active Problem List   Diagnosis     NO ACTIVE PROBLEMS     Diarrhea, unspecified type     Past Surgical History:   Procedure Laterality Date     ABDOMEN SURGERY       section     BIOPSY      Lipoma removal in R forearm     COLONOSCOPY N/A 2019    Procedure: COLONOSCOPY  with biopsies by cold forcep;  Surgeon: Alfonso Badillo MD;  Location:  GI     ESOPHAGOSCOPY, GASTROSCOPY, DUODENOSCOPY (EGD), COMBINED N/A 2019    Procedure: Esophagoscopy, gastroscopy, duodenoscopy (EGD), combined with biopsies by cold forcep;  Surgeon: Alfonso Badillo MD;  Location:  GI     EXC SKIN BENIG 0.5CM OR LESS TRUNK,ARM,LEG Right 2012    lipoma benign      Social History     Tobacco Use     Smoking status: Never     Smokeless tobacco: Never   Substance Use Topics     Alcohol use: Yes     Comment: socially      Problem (# of Occurrences) Relation (Name,Age of Onset)    Heart Disease (2) Paternal  "Grandmother, Paternal Grandfather    Hypertension (1) Mother (Nancy Alegria)    Osteoporosis (2) Mother (Nancy Alegria), Maternal Grandmother (Kaylen)    Other Cancer (2) Father (Tyson Alegria), Maternal Grandfather: Basal Cell Carcinoma    Colon Cancer (1) Cousin (Cinyd)    Lung Cancer (1) Father (Tyson Alegria, 55): passed 56yrs    No Known Problems (4) Sister, Brother, Daughter, Son            Current Outpatient Medications   Medication Sig     drospirenone-ethinyl estradiol (JOLIE) 3-0.03 MG tablet Take 1 tablet by mouth daily     MULTIPLE VITAMIN PO      VITAMIN D PO      No current facility-administered medications for this visit.     Allergies   Allergen Reactions     Amoxicillin Rash       ROS:   ROS: 10 point ROS neg other than the symptoms noted above in the HPI.      OBJECTIVE:     /74   Ht 1.6 m (5' 3\")   Wt 64.9 kg (143 lb)   LMP 06/15/2023   BMI 25.33 kg/m    Body mass index is 25.33 kg/m .    PHYSICAL EXAM:  Constitutional:  Appearance: Well nourished, well developed alert, in no acute distress  Breasts:  Inspection of Breasts:  Symmetric bilaterally.  No puckering.  No skin changes.  Palpation of Breasts and Axillae:  No masses present on palpation, Axillary Lymph Nodes:  No lymphadenopathy present. Pinpoint focal tenderness left breast upper outer quadrant, 2o'clock position  Neurologic:  Mental Status:  Oriented X3.  Normal strength and tone, sensory exam grossly normal, mentation intact and speech normal.    Psychiatric:  Mentation appears normal and affect normal/bright.     In-Clinic Test Results:  No results found for this or any previous visit (from the past 24 hour(s)).    ASSESSMENT/PLAN:                                                        ICD-10-CM    1. Focal non-cyclical breast pain  N64.4 MA Diagnostic Digital Left     US Breast Left Limited 1-3 Quadrants          PLAN:    Reviewed recommendation for diagnostic mammo and ultrasound in the context of non-cyclic focal breast " pain >40 yrs old. Patient accepting of recommendation. Orders placed.     KEVIN Plunkett CNM

## 2023-07-03 ENCOUNTER — OFFICE VISIT (OUTPATIENT)
Dept: MIDWIFE SERVICES | Facility: CLINIC | Age: 43
End: 2023-07-03
Payer: COMMERCIAL

## 2023-07-03 VITALS
WEIGHT: 143 LBS | SYSTOLIC BLOOD PRESSURE: 126 MMHG | DIASTOLIC BLOOD PRESSURE: 74 MMHG | BODY MASS INDEX: 25.34 KG/M2 | HEIGHT: 63 IN

## 2023-07-03 DIAGNOSIS — N64.4 FOCAL NON-CYCLICAL BREAST PAIN: Primary | ICD-10-CM

## 2023-07-03 PROCEDURE — 99213 OFFICE O/P EST LOW 20 MIN: CPT | Performed by: REGISTERED NURSE

## 2023-07-17 ENCOUNTER — HOSPITAL ENCOUNTER (OUTPATIENT)
Dept: MAMMOGRAPHY | Facility: CLINIC | Age: 43
Discharge: HOME | End: 2023-07-17
Attending: REGISTERED NURSE
Payer: COMMERCIAL

## 2023-07-17 ENCOUNTER — HOSPITAL ENCOUNTER (OUTPATIENT)
Dept: ULTRASOUND IMAGING | Facility: CLINIC | Age: 43
Discharge: HOME | End: 2023-07-17
Attending: REGISTERED NURSE
Payer: COMMERCIAL

## 2023-07-17 DIAGNOSIS — N64.4 FOCAL NON-CYCLICAL BREAST PAIN: ICD-10-CM

## 2023-07-17 PROCEDURE — 76642 ULTRASOUND BREAST LIMITED: CPT | Mod: LT

## 2023-07-17 PROCEDURE — 77066 DX MAMMO INCL CAD BI: CPT

## 2024-05-02 ENCOUNTER — OFFICE VISIT (OUTPATIENT)
Dept: OBGYN | Facility: CLINIC | Age: 44
End: 2024-05-02
Payer: COMMERCIAL

## 2024-05-02 VITALS — BODY MASS INDEX: 26.43 KG/M2 | SYSTOLIC BLOOD PRESSURE: 128 MMHG | WEIGHT: 149.2 LBS | DIASTOLIC BLOOD PRESSURE: 78 MMHG

## 2024-05-02 DIAGNOSIS — Z13.9 SCREENING FOR CONDITION: ICD-10-CM

## 2024-05-02 DIAGNOSIS — Z12.31 VISIT FOR SCREENING MAMMOGRAM: Primary | ICD-10-CM

## 2024-05-02 PROCEDURE — 99396 PREV VISIT EST AGE 40-64: CPT | Performed by: FAMILY MEDICINE

## 2024-05-02 NOTE — PATIENT INSTRUCTIONS
For fasting labs (for cholesterol):  please Call [phone] to schedule lab    Or if you prefer you can get non-fasting cholesterol and all the labs today    To schedule mammogram they will call you, or you can call [phone] to schedule it!    Dr. Anaid Wheeler, DO    Obstetrics and Gynecology  [facility] and [place]

## 2024-05-02 NOTE — NURSING NOTE
"Chief Complaint   Patient presents with    Gyn Exam     Pelvis & Breast exam   Pap UTD NIL   Mammo due          Initial /78 (BP Location: Left arm, Cuff Size: Adult Regular)   Wt 67.7 kg (149 lb 3.2 oz)   LMP 2024 (Exact Date)   BMI 26.43 kg/m   Estimated body mass index is 26.43 kg/m  as calculated from the following:    Height as of 7/3/23: 1.6 m (5' 3\").    Weight as of this encounter: 67.7 kg (149 lb 3.2 oz).  BP completed using cuff size: regular    Questioned patient about current smoking habits.  Pt. has never smoked.          The following HM Due: Mammo         Raquel Mirza, LEVY on 2024 at 1:29 PM    "

## 2024-05-02 NOTE — PROGRESS NOTES
SUBJECTIVE:  Melissa Dawn Severson is an 43 year old  woman who presents for   annual gyn exam. Patient's last menstrual period was 2024 (exact date). Periods are regular q 28-30 days, lasting   4 days. Dysmenorrhea:mild, occurring premenstrually and first 1-2 days of flow. Cyclic symptoms   include none. No intermenstrual bleeding,   spotting, or discharge.  Menarche age [age].  STD hx: none.    Current contraception: vasectomy  HAN exposure: no  History of abnormal Pap smear: No  Family history of uterine or ovarian cancer: No  Regular self breast exam: Yes  History of abnormal mammogram: No  Family history of breast cancer: No  History of abnormal lipids: No    Past Medical History:   Diagnosis Date    Chronic diarrhea     H/O calculus of kidney during pregnancy     Migraines     Visual migraines    Stomach problems     Ongoing for last few years    Vision disorder Dryness in eyes        Family History   Problem Relation Age of Onset    Hypertension Mother     Osteoporosis Mother     Lung Cancer Father 55        passed 56yrs    Other Cancer Father     No Known Problems Sister     No Known Problems Brother     Other Cancer Maternal Grandfather         Basal Cell Carcinoma    Heart Disease Paternal Grandmother     Heart Disease Paternal Grandfather     Colon Cancer Cousin     Osteoporosis Maternal Grandmother     No Known Problems Daughter     No Known Problems Son        Past Surgical History:   Procedure Laterality Date    ABDOMEN SURGERY       section    BIOPSY      Lipoma removal in R forearm    COLONOSCOPY N/A 2019    Procedure: COLONOSCOPY  with biopsies by cold forcep;  Surgeon: Alfonso Badillo MD;  Location:  GI    ESOPHAGOSCOPY, GASTROSCOPY, DUODENOSCOPY (EGD), COMBINED N/A 2019    Procedure: Esophagoscopy, gastroscopy, duodenoscopy (EGD), combined with biopsies by cold forcep;  Surgeon: Alfonso Badillo MD;  Location:  GI    EXC SKIN BENIG 0.5CM OR LESS  TRUNK,ARM,LEG Right 2012    lipoma benign       Current Outpatient Medications   Medication Sig Dispense Refill    MULTIPLE VITAMIN PO       drospirenone-ethinyl estradiol (JOLIE) 3-0.03 MG tablet Take 1 tablet by mouth daily 84 tablet 4    VITAMIN D PO        No current facility-administered medications for this visit.     Allergies   Allergen Reactions    Amoxicillin Rash       Social History     Tobacco Use    Smoking status: Never    Smokeless tobacco: Never   Substance Use Topics    Alcohol use: Yes     Comment: socially       Review Of Systems  Ears/Nose/Throat: negative  Respiratory: No shortness of breath, dyspnea on exertion, cough, or hemoptysis  Cardiovascular: negative  Gastrointestinal: negative  Genitourinary: See HPI   Constitutional, HEENT, cardiovascular, pulmonary, GI, , musculoskeletal, neuro, skin, endocrine and psych systems are negative, except as otherwise noted.      OBJECTIVE:  /78 (BP Location: Left arm, Cuff Size: Adult Regular)   Wt 67.7 kg (149 lb 3.2 oz)   LMP 04/18/2024 (Exact Date)   BMI 26.43 kg/m      General appearance: healthy, alert and no distress  Skin: Skin color, texture, turgor normal. No rashes or lesions.  Ears: negative  Nose/Sinuses: Nares normal. Septum midline. Mucosa normal. No drainage or sinus tenderness.  Oropharynx: Lips, mucosa, and tongue normal. Teeth and gums normal.  Neck: Neck supple. No adenopathy. Thyroid symmetric, normal size,, Carotids without bruits.  Lungs: negative, Percussion normal. Good diaphragmatic excursion. Lungs clear  Heart: negative, PMI normal. No lifts, heaves, or thrills. RRR. No murmurs, clicks gallops or rub  Breasts: Inspection negative. No nipple discharge or bleeding. No masses.  Abdomen: Abdomen soft, non-tender. BS normal. No masses, organomegaly  Pelvic: Pelvic:  Pelvic examination with no pap/no Gonorrhea and Chlamydia   including  External genitalia normal   and vagina normal rugatted not atrophic  Examination of  urethra  normal no masses, tenderness, scarring  bladder, no masses or tenderness  Cervix no lesions or discharge    Bimanual exam with   Uterus 8 weeks size, mid position, mobile,no-tenderness, normal no descent   Adnexa/parametria  normal no masses         ASSESSMENT:  Melissa Dawn Severson is an 43 year old  woman who presents for   annual gyn exam.     PLAN:  Dx:  1)  Pap smear not due  Gonorrhea  Chlamydia  declines  2)  Mammography  ordered, lipids at appropriate intervals ordered   Colonoscopy not due   3)  Contraception: vasectomy       PE:  Reviewed health maintenance including diet, regular exercise   and periodic exams.    Dr. Anaid Wheeler, DO    Obstetrics and Gynecology  [facility] [place] and [place]

## 2024-09-04 ENCOUNTER — HOSPITAL ENCOUNTER (OUTPATIENT)
Dept: MAMMOGRAPHY | Facility: CLINIC | Age: 44
Discharge: HOME | End: 2024-09-04
Attending: FAMILY MEDICINE | Admitting: FAMILY MEDICINE
Payer: COMMERCIAL

## 2024-09-04 DIAGNOSIS — Z12.31 VISIT FOR SCREENING MAMMOGRAM: ICD-10-CM

## 2024-09-04 PROCEDURE — 77063 BREAST TOMOSYNTHESIS BI: CPT

## 2025-06-07 ENCOUNTER — HEALTH MAINTENANCE LETTER (OUTPATIENT)
Age: 45
End: 2025-06-07

## 2025-08-21 SDOH — HEALTH STABILITY: PHYSICAL HEALTH: ON AVERAGE, HOW MANY MINUTES DO YOU ENGAGE IN EXERCISE AT THIS LEVEL?: 30 MIN

## 2025-08-21 SDOH — HEALTH STABILITY: PHYSICAL HEALTH: ON AVERAGE, HOW MANY DAYS PER WEEK DO YOU ENGAGE IN MODERATE TO STRENUOUS EXERCISE (LIKE A BRISK WALK)?: 4 DAYS

## 2025-08-22 ENCOUNTER — OFFICE VISIT (OUTPATIENT)
Dept: FAMILY MEDICINE | Facility: CLINIC | Age: 45
End: 2025-08-22
Payer: COMMERCIAL

## 2025-08-22 VITALS
HEIGHT: 63 IN | WEIGHT: 148.7 LBS | DIASTOLIC BLOOD PRESSURE: 85 MMHG | SYSTOLIC BLOOD PRESSURE: 126 MMHG | RESPIRATION RATE: 20 BRPM | HEART RATE: 80 BPM | TEMPERATURE: 98 F | BODY MASS INDEX: 26.35 KG/M2 | OXYGEN SATURATION: 98 %

## 2025-08-22 DIAGNOSIS — L98.9 SCALP LESION: ICD-10-CM

## 2025-08-22 DIAGNOSIS — L65.9 HAIR THINNING: ICD-10-CM

## 2025-08-22 DIAGNOSIS — Z13.220 LIPID SCREENING: ICD-10-CM

## 2025-08-22 DIAGNOSIS — L98.9 SKIN LESION: ICD-10-CM

## 2025-08-22 DIAGNOSIS — R23.8 SCALP IRRITATION: ICD-10-CM

## 2025-08-22 DIAGNOSIS — Z00.00 ROUTINE GENERAL MEDICAL EXAMINATION AT A HEALTH CARE FACILITY: Primary | ICD-10-CM

## 2025-08-22 DIAGNOSIS — Z11.59 NEED FOR HEPATITIS C SCREENING TEST: ICD-10-CM

## 2025-08-22 DIAGNOSIS — N89.8 VAGINAL DISCHARGE: ICD-10-CM

## 2025-08-22 DIAGNOSIS — Z13.1 SCREENING FOR DIABETES MELLITUS: ICD-10-CM

## 2025-08-22 DIAGNOSIS — Z11.4 SCREENING FOR HIV (HUMAN IMMUNODEFICIENCY VIRUS): ICD-10-CM

## 2025-08-22 PROBLEM — R19.7 DIARRHEA, UNSPECIFIED TYPE: Status: RESOLVED | Noted: 2018-03-20 | Resolved: 2025-08-22

## 2025-08-22 LAB
ALBUMIN SERPL BCG-MCNC: 4.9 G/DL (ref 3.5–5.2)
ALP SERPL-CCNC: 71 U/L (ref 40–150)
ALT SERPL W P-5'-P-CCNC: 14 U/L (ref 0–50)
ANION GAP SERPL CALCULATED.3IONS-SCNC: 12 MMOL/L (ref 7–15)
AST SERPL W P-5'-P-CCNC: 26 U/L (ref 0–45)
BASOPHILS # BLD AUTO: 0.04 10E3/UL (ref 0–0.2)
BASOPHILS NFR BLD AUTO: 0.6 %
BILIRUB SERPL-MCNC: 0.6 MG/DL
BUN SERPL-MCNC: 16.9 MG/DL (ref 6–20)
CALCIUM SERPL-MCNC: 10.7 MG/DL (ref 8.8–10.4)
CHLORIDE SERPL-SCNC: 100 MMOL/L (ref 98–107)
CHOLEST SERPL-MCNC: 236 MG/DL
CLUE CELLS: NORMAL
CREAT SERPL-MCNC: 0.83 MG/DL (ref 0.51–0.95)
EGFRCR SERPLBLD CKD-EPI 2021: 89 ML/MIN/1.73M2
EOSINOPHIL # BLD AUTO: 0.04 10E3/UL (ref 0–0.7)
EOSINOPHIL NFR BLD AUTO: 0.6 %
ERYTHROCYTE [DISTWIDTH] IN BLOOD BY AUTOMATED COUNT: 11.4 % (ref 10–15)
FASTING STATUS PATIENT QL REPORTED: ABNORMAL
FASTING STATUS PATIENT QL REPORTED: ABNORMAL
GLUCOSE SERPL-MCNC: 89 MG/DL (ref 70–99)
HCO3 SERPL-SCNC: 27 MMOL/L (ref 22–29)
HCT VFR BLD AUTO: 45.1 % (ref 35–47)
HCV AB SERPL QL IA: NONREACTIVE
HDLC SERPL-MCNC: 72 MG/DL
HGB BLD-MCNC: 15.4 G/DL (ref 11.7–15.7)
HIV 1+2 AB+HIV1 P24 AG SERPL QL IA: NONREACTIVE
IMM GRANULOCYTES # BLD: <0.04 10E3/UL
IMM GRANULOCYTES NFR BLD: 0 %
LDLC SERPL CALC-MCNC: 147 MG/DL
LYMPHOCYTES # BLD AUTO: 2 10E3/UL (ref 0.8–5.3)
LYMPHOCYTES NFR BLD AUTO: 30.8 %
MCH RBC QN AUTO: 32.8 PG (ref 26.5–33)
MCHC RBC AUTO-ENTMCNC: 34.1 G/DL (ref 31.5–36.5)
MCV RBC AUTO: 96.2 FL (ref 78–100)
MONOCYTES # BLD AUTO: 0.42 10E3/UL (ref 0–1.3)
MONOCYTES NFR BLD AUTO: 6.5 %
NEUTROPHILS # BLD AUTO: 4 10E3/UL (ref 1.6–8.3)
NEUTROPHILS NFR BLD AUTO: 61.5 %
NONHDLC SERPL-MCNC: 164 MG/DL
PLATELET # BLD AUTO: 264 10E3/UL (ref 150–450)
POTASSIUM SERPL-SCNC: 4.1 MMOL/L (ref 3.4–5.3)
PROT SERPL-MCNC: 8 G/DL (ref 6.4–8.3)
RBC # BLD AUTO: 4.69 10E6/UL (ref 3.8–5.2)
SODIUM SERPL-SCNC: 139 MMOL/L (ref 135–145)
TRICHOMONAS, WET PREP: NORMAL
TRIGL SERPL-MCNC: 83 MG/DL
TSH SERPL DL<=0.005 MIU/L-ACNC: 1.63 UIU/ML (ref 0.3–4.2)
WBC # BLD AUTO: 6.5 10E3/UL (ref 4–11)
WBC'S/HIGH POWER FIELD, WET PREP: NORMAL
YEAST, WET PREP: NORMAL

## 2025-08-22 PROCEDURE — 99459 PELVIC EXAMINATION: CPT | Performed by: NURSE PRACTITIONER

## 2025-08-22 PROCEDURE — 1126F AMNT PAIN NOTED NONE PRSNT: CPT | Performed by: NURSE PRACTITIONER

## 2025-08-22 PROCEDURE — 3079F DIAST BP 80-89 MM HG: CPT | Performed by: NURSE PRACTITIONER

## 2025-08-22 PROCEDURE — 99386 PREV VISIT NEW AGE 40-64: CPT | Performed by: NURSE PRACTITIONER

## 2025-08-22 PROCEDURE — 84443 ASSAY THYROID STIM HORMONE: CPT | Performed by: NURSE PRACTITIONER

## 2025-08-22 PROCEDURE — 3050F LDL-C >= 130 MG/DL: CPT | Performed by: NURSE PRACTITIONER

## 2025-08-22 PROCEDURE — 85025 COMPLETE CBC W/AUTO DIFF WBC: CPT | Performed by: NURSE PRACTITIONER

## 2025-08-22 PROCEDURE — 80061 LIPID PANEL: CPT | Performed by: NURSE PRACTITIONER

## 2025-08-22 PROCEDURE — 86803 HEPATITIS C AB TEST: CPT | Performed by: NURSE PRACTITIONER

## 2025-08-22 PROCEDURE — 87389 HIV-1 AG W/HIV-1&-2 AB AG IA: CPT | Performed by: NURSE PRACTITIONER

## 2025-08-22 PROCEDURE — 3074F SYST BP LT 130 MM HG: CPT | Performed by: NURSE PRACTITIONER

## 2025-08-22 PROCEDURE — 80053 COMPREHEN METABOLIC PANEL: CPT | Performed by: NURSE PRACTITIONER

## 2025-08-22 PROCEDURE — 36415 COLL VENOUS BLD VENIPUNCTURE: CPT | Performed by: NURSE PRACTITIONER

## 2025-08-22 PROCEDURE — 87210 SMEAR WET MOUNT SALINE/INK: CPT | Performed by: NURSE PRACTITIONER

## 2025-08-22 PROCEDURE — 99213 OFFICE O/P EST LOW 20 MIN: CPT | Mod: 25 | Performed by: NURSE PRACTITIONER

## 2025-08-22 PROCEDURE — G2211 COMPLEX E/M VISIT ADD ON: HCPCS | Performed by: NURSE PRACTITIONER

## 2025-08-22 ASSESSMENT — PAIN SCALES - GENERAL: PAINLEVEL_OUTOF10: NO PAIN (0)

## 2025-08-24 ENCOUNTER — RESULTS FOLLOW-UP (OUTPATIENT)
Dept: FAMILY MEDICINE | Facility: CLINIC | Age: 45
End: 2025-08-24
Payer: COMMERCIAL

## 2025-08-24 DIAGNOSIS — E83.52 SERUM CALCIUM ELEVATED: Primary | ICD-10-CM

## 2025-08-25 ENCOUNTER — PATIENT OUTREACH (OUTPATIENT)
Dept: CARE COORDINATION | Facility: CLINIC | Age: 45
End: 2025-08-25
Payer: COMMERCIAL

## 2025-08-27 ENCOUNTER — PATIENT OUTREACH (OUTPATIENT)
Dept: CARE COORDINATION | Facility: CLINIC | Age: 45
End: 2025-08-27
Payer: COMMERCIAL

## (undated) DEVICE — ENDO FORCEP ENDOJAW BIOPSY 2.8MMX230CM FB-220U

## (undated) DEVICE — KIT ENDO TURNOVER/PROCEDURE W/CLEAN A SCOPE LINERS 103888

## (undated) RX ORDER — FENTANYL CITRATE 50 UG/ML
INJECTION, SOLUTION INTRAMUSCULAR; INTRAVENOUS
Status: DISPENSED
Start: 2019-07-23

## (undated) RX ORDER — ONDANSETRON 2 MG/ML
INJECTION INTRAMUSCULAR; INTRAVENOUS
Status: DISPENSED
Start: 2019-07-23